# Patient Record
Sex: MALE | Race: WHITE | URBAN - METROPOLITAN AREA
[De-identification: names, ages, dates, MRNs, and addresses within clinical notes are randomized per-mention and may not be internally consistent; named-entity substitution may affect disease eponyms.]

---

## 2021-10-25 ENCOUNTER — OFFICE VISIT (OUTPATIENT)
Dept: FAMILY MEDICINE CLINIC | Age: 51
End: 2021-10-25
Payer: COMMERCIAL

## 2021-10-25 VITALS
SYSTOLIC BLOOD PRESSURE: 142 MMHG | RESPIRATION RATE: 13 BRPM | DIASTOLIC BLOOD PRESSURE: 90 MMHG | OXYGEN SATURATION: 97 % | HEART RATE: 104 BPM | HEIGHT: 69 IN | BODY MASS INDEX: 44.73 KG/M2 | WEIGHT: 302 LBS | TEMPERATURE: 98.4 F

## 2021-10-25 DIAGNOSIS — E11.9 TYPE 2 DIABETES MELLITUS WITHOUT COMPLICATION, WITHOUT LONG-TERM CURRENT USE OF INSULIN (HCC): Primary | ICD-10-CM

## 2021-10-25 DIAGNOSIS — Z23 NEED FOR TETANUS BOOSTER: ICD-10-CM

## 2021-10-25 LAB — HBA1C MFR BLD: 9.6 %

## 2021-10-25 PROCEDURE — 90471 IMMUNIZATION ADMIN: CPT | Performed by: INTERNAL MEDICINE

## 2021-10-25 PROCEDURE — 83036 HEMOGLOBIN GLYCOSYLATED A1C: CPT | Performed by: INTERNAL MEDICINE

## 2021-10-25 PROCEDURE — 99214 OFFICE O/P EST MOD 30 MIN: CPT | Performed by: INTERNAL MEDICINE

## 2021-10-25 PROCEDURE — 90715 TDAP VACCINE 7 YRS/> IM: CPT | Performed by: INTERNAL MEDICINE

## 2021-10-25 RX ORDER — LANCETS 30 GAUGE
EACH MISCELLANEOUS
Qty: 1 EACH | Refills: 5 | Status: SHIPPED | OUTPATIENT
Start: 2021-10-25 | End: 2022-01-04 | Stop reason: SDUPTHER

## 2021-10-25 RX ORDER — GLUCOSAMINE HCL/CHONDROITIN SU 500-400 MG
CAPSULE ORAL
Qty: 100 STRIP | Refills: 0 | Status: SHIPPED | OUTPATIENT
Start: 2021-10-25 | End: 2022-01-04 | Stop reason: SDUPTHER

## 2021-10-25 SDOH — ECONOMIC STABILITY: FOOD INSECURITY: WITHIN THE PAST 12 MONTHS, THE FOOD YOU BOUGHT JUST DIDN'T LAST AND YOU DIDN'T HAVE MONEY TO GET MORE.: NEVER TRUE

## 2021-10-25 SDOH — ECONOMIC STABILITY: FOOD INSECURITY: WITHIN THE PAST 12 MONTHS, YOU WORRIED THAT YOUR FOOD WOULD RUN OUT BEFORE YOU GOT MONEY TO BUY MORE.: NEVER TRUE

## 2021-10-25 ASSESSMENT — ENCOUNTER SYMPTOMS
RHINORRHEA: 0
WHEEZING: 0
SINUS PAIN: 0
BACK PAIN: 0
EYE DISCHARGE: 0
EYE ITCHING: 0
VOICE CHANGE: 0
CHEST TIGHTNESS: 0
COLOR CHANGE: 0
EYE REDNESS: 0
EYE PAIN: 0
CONSTIPATION: 0
SHORTNESS OF BREATH: 0
BLOOD IN STOOL: 0
SINUS PRESSURE: 0
NAUSEA: 0
SORE THROAT: 0
VOMITING: 0
RECTAL PAIN: 0
ABDOMINAL PAIN: 0
FACIAL SWELLING: 0
DIARRHEA: 0
APNEA: 0
COUGH: 0
TROUBLE SWALLOWING: 0
ABDOMINAL DISTENTION: 0
PHOTOPHOBIA: 0

## 2021-10-25 ASSESSMENT — PATIENT HEALTH QUESTIONNAIRE - PHQ9
SUM OF ALL RESPONSES TO PHQ QUESTIONS 1-9: 0
1. LITTLE INTEREST OR PLEASURE IN DOING THINGS: 0
SUM OF ALL RESPONSES TO PHQ QUESTIONS 1-9: 0
SUM OF ALL RESPONSES TO PHQ9 QUESTIONS 1 & 2: 0
2. FEELING DOWN, DEPRESSED OR HOPELESS: 0
SUM OF ALL RESPONSES TO PHQ QUESTIONS 1-9: 0

## 2021-10-25 ASSESSMENT — SOCIAL DETERMINANTS OF HEALTH (SDOH): HOW HARD IS IT FOR YOU TO PAY FOR THE VERY BASICS LIKE FOOD, HOUSING, MEDICAL CARE, AND HEATING?: NOT HARD AT ALL

## 2021-10-25 NOTE — PROGRESS NOTES
Subjective:      Patient ID: Bar Palafox is a 49 y.o. male Established patient, here for evaluation of the following chief complaint(s):  Chief Complaint   Patient presents with   Dano Qudenisk     found glucose in urine during work screening   [facility]     due for [facility]       HPI  50-year-old male recently found to have hyperglycemia during work screening. Blood sugars were noted to be 2040. The patient requests screening for type 2 diabetes. At present he denies polyuria,  Polydipsia, constitutional, sinus, visual, cardiopulmonary, urologic, gastrointestinal, immunologic/hematologic, musculoskeletal, neurologic,dermatologic, or psychiatric complaints. Review of Systems   Constitutional: Negative for chills, diaphoresis, fatigue and fever. HENT: Negative for congestion, dental problem, drooling, ear discharge, ear pain, facial swelling, hearing loss, mouth sores, nosebleeds, postnasal drip, rhinorrhea, sinus pressure, sinus pain, sneezing, sore throat, tinnitus, trouble swallowing and voice change. Eyes: Negative for photophobia, pain, discharge, redness, itching and visual disturbance. Respiratory: Negative for apnea, cough, chest tightness, shortness of breath and wheezing. Cardiovascular: Negative for chest pain, palpitations and leg swelling. Gastrointestinal: Negative for abdominal distention, abdominal pain, blood in stool, constipation, diarrhea, nausea, rectal pain and vomiting. Endocrine: Negative for cold intolerance, heat intolerance, polydipsia, polyphagia and polyuria. Genitourinary: Negative for decreased urine volume, difficulty urinating, dysuria, flank pain, frequency, genital sores, hematuria and urgency. Musculoskeletal: Negative for arthralgias, back pain, gait problem, joint swelling, myalgias, neck pain and neck stiffness. Skin: Negative for color change, rash and wound.    Allergic/Immunologic: Negative for environmental allergies and food allergies. Neurological: Negative for dizziness, tremors, seizures, syncope, facial asymmetry, speech difficulty, weakness, light-headedness, numbness and headaches. Hematological: Negative for adenopathy. Does not bruise/bleed easily. Psychiatric/Behavioral: Negative for agitation, confusion, decreased concentration, hallucinations, self-injury, sleep disturbance and suicidal ideas. The patient is not nervous/anxious. Objective:   BP (!) 142/90   Pulse 104   Temp 98.4 °F (36.9 °C)   Resp 13   Ht 5' 8.5\" (1.74 m)   Wt (!) 302 lb (137 kg)   SpO2 97%   BMI 45.25 kg/m²     Physical Exam  Constitutional:       General: He is not in acute distress. Appearance: He is well-developed. HENT:      Head: Normocephalic. Right Ear: External ear normal.      Left Ear: External ear normal.   Eyes:      Conjunctiva/sclera: Conjunctivae normal.   Neck:      Vascular: No JVD. Trachea: No tracheal deviation. Cardiovascular:      Rate and Rhythm: Normal rate and regular rhythm. Heart sounds: Normal heart sounds. Pulmonary:      Effort: Pulmonary effort is normal. No respiratory distress. Breath sounds: Normal breath sounds. No wheezing or rales. Chest:      Chest wall: No tenderness. Abdominal:      General: Bowel sounds are normal. There is no distension. Palpations: Abdomen is soft. There is no mass. Tenderness: There is no abdominal tenderness. There is no guarding or rebound. Musculoskeletal:         General: No tenderness or deformity. Cervical back: Neck supple. Skin:     General: Skin is warm and dry. Coloration: Skin is not pale. Findings: No erythema or rash. Neurological:      Mental Status: He is alert and oriented to person, place, and time. Cranial Nerves: No cranial nerve deficit. Motor: No abnormal muscle tone. Psychiatric:         Thought Content:  Thought content normal.         Judgment: Judgment normal.         Assessment: Diagnosis Orders   1. Type 2 diabetes mellitus without complication, without long-term current use of insulin (HCC)  POCT glycosylated hemoglobin (Hb A1C)    [facility] Diabetic Education, [facility]   2. Need for tetanus booster  Tdap (age 6y and older)  ([address])         Plan:      Chandan was seen today for check-up and health maintenance. Diagnoses and all orders for this visit:    Type 2 diabetes mellitus without complication, without long-term current use of insulin (HCC)  -     POCT glycosylated hemoglobin (Hb A1C)  -     [facility] Diabetic [facility], [facility] Nik Hebert    Need for tetanus booster  -     Tdap (age 6y and older)  ([address])    Other orders  -     blood glucose monitor kit and supplies; DX: diabetes mellitus. Test 1-3 time(s) daily - Ok to substitute per insurance  -     blood glucose monitor strips; Test 3 times a day & as needed for symptoms of irregular blood glucose. Dispense sufficient amount for indicated testing frequency plus additional to accommodate PRN testing needs. -     Lancets MISC; DX: diabetes mellitus. Use 1-3 time(s) daily - Ok to substitute per insurance (1 each = 1 box)  -     metFORMIN (GLUCOPHAGE) 500 MG tablet; Take 1 tablet by mouth 2 times daily (with meals)         Return in about 8 weeks (around 12/20/2021). On this date 10/25/21 I have spent 30 minutes reviewing previous notes, test results and face to face with the patient discussing the diagnosis and importance of compliance with the treatment plan. Bharath Toro MD    Please note, this report has been partially produced using speech recognition software  and may cause  and /or contain errors related to that system including grammar, punctuation and spelling as well as words and phrases that may seem inappropriate. If there are questions or concerns please feel free to contact me to clarify.

## 2021-11-22 ENCOUNTER — HOSPITAL ENCOUNTER (OUTPATIENT)
Dept: NUTRITION | Age: 51
Discharge: HOME | End: 2021-11-22
Payer: COMMERCIAL

## 2021-11-22 PROCEDURE — 97802 MEDICAL NUTRITION INDIV IN: CPT

## 2021-11-22 NOTE — PROGRESS NOTES
OUTPATIENT NUTRITION COUNSELING       Livan Stack is a 49 y.o.  male     Reason for Counseling: DM2/obesity      Subjective/Current Data:  Pt presents today with new onset DM2 with obesity. He is from home with his wife. Pt has actively been trying to make changes since his DM2 dx and admits it made him start to pay attention to his health. Reports he has been really starting to avoid sugar on nutrition labels. He is unsure if he has a family dx of dm2 as he was adopted as a baby. He admits to getting little physical activity, currently working night shift so his schedule makes it hard for him to eat right. He rarely drinks alcohol, does not drink coffee or pop, drinks ~32oz of water daily and Gaterade zero. Only eats fast food on occasion. He eats 2 meals a day. 24 hour recall includes: Breakfast: plain cheerios with milk; Lunch: Skip at work; Dinner: bowl of cereal.       Past Medical History:  Past Medical History:   Diagnosis Date    Seizures ([facility] [address] 2012     Past Surgical History:   Procedure Laterality Date    VASECTOMY  2009       Labs:    Chemistry CBC/Coags Misc. No results for input(s): NA, K, CL, CO2, BUN, CREATININE, GLUCOSE in the last 72 hours. Invalid input(s): CA  No results for input(s): PHOS in the last 72 hours. No results for input(s): WBC, RBC, HGB, HCT, MCV, MCH, MCHC, RDW, PLT, MPV in the last 72 hours. No results for input(s): LABALBU in the last 72 hours. Invalid input(s):  PREALBUMIN  Lab Results   Component Value Date    LABA1C 9.6 10/25/2021            Anthropometric Measures:  Height: 5'8\"  Weight: 302#  Ideal Body Weight: 154#  Ideal Body Weight %: >100%  BMI: 45.25  Obese Class III. Wt Readings from Last 20 Encounters:   10/25/21 (!) 302 lb (137 kg)   08/28/14 (!) 305 lb 9.6 oz (138.6 kg)   05/03/13 260 lb 9.6 oz (118.2 kg)   04/15/13 263 lb (119.3 kg)   04/04/13 266 lb (120.7 kg)   02/28/13 260 lb (117.9 kg)   12/27/12 283 lb 3.2 oz (128.5 kg)       Assessment and Plan:  I instructed the pt on basic diabetic diet guidelines. We discussed carb counting, foods that contained carbs, and how to incorporate the correct portions in each meals. I reviewed the importance of not skipping meals. Emphasis was placed on having a set meal schedule and getting regular physical activity. Together we practiced counting carbs and coming up with a meal plan that works best with the pt's lifestyle. Pt was receptive to education and asked appropriate questions. Readiness to change is high. Nutritional Requirements:  Estimated Energy Needs:  Weight Used:137kg   Method Used: 12kcal/kg  Estimated kcal Needs: 1600 kcal per day  Protein Needs:  Weight used: 70 kg  1 g/kg = 70g per day      Nutrition Diagnosis and Goal  Problem: Food and nutrition related knowledge deficit  Etiology/related to: no prior nutrition education   Symptoms/Signs/as evidenced by: 24 hour recall, new DM2 dx      Goal:   1) Eat 3-4 carb servings at every meal, 1-2 serving for evening snack  2) Drink 64oz of water daily   3) 1 hour of walking on treadmill every other day    Education Needs: Provided Diabetic guidelines, food label instructions, 1600 5 day menu, DM snacks        NUTRITION RECOMMENDATIONS / MONITORING / EVALUATION  1. Name and Office phone number given for reference.     Elizabeth Jansen, MS, RD, LD

## 2021-11-23 NOTE — TELEPHONE ENCOUNTER
Pt request med refill, has only 2 pills left. Has left message in [place]. Pt requests message that order has been placed on my chart.

## 2021-11-24 NOTE — TELEPHONE ENCOUNTER
Tino Kan [facility] Clinical Staff  Subject: Refill Request     QUESTIONS   Name of Medication? metFORMIN (GLUCOPHAGE) 500 MG tablet   Patient-reported dosage and instructions? doesn't know   How many days do you have left? 0   Preferred Pharmacy? [id number] [place]   Pharmacy phone number (if available)? [phone]   Additional Information for Provider? PT said he is out of medication   ---------------------------------------------------------------------------   --------------   CALL BACK INFO   What is the best way for the office to contact you? OK to leave message on   voicemail   Preferred Call Back Phone Number?  [phone]
CKD (chronic kidney disease) stage 3, GFR 30-59 ml/min

## 2021-12-01 NOTE — TELEPHONE ENCOUNTER
----- Message from Ann Marie Tiesha sent at 12/1/2021 10:04 AM EST -----  Subject: Refill Request    QUESTIONS  Name of Medication? metFORMIN (GLUCOPHAGE) 500 MG tablet  Patient-reported dosage and instructions? Take 1 tablet by mouth 2 times   daily (with meals)  How many days do you have left? 0  Preferred Pharmacy? [id number]  Pharmacy phone number (if available)? [phone]  ---------------------------------------------------------------------------  --------------  Reid Fearing INFO  What is the best way for the office to contact you? OK to leave message on   voicemail  Preferred Call Back Phone Number?  [phone]

## 2021-12-17 ASSESSMENT — ENCOUNTER SYMPTOMS
SORE THROAT: 0
PHOTOPHOBIA: 0
COLOR CHANGE: 0
CONSTIPATION: 0
RECTAL PAIN: 0
WHEEZING: 0
SINUS PRESSURE: 0
BACK PAIN: 0
APNEA: 0
SINUS PAIN: 0
VOICE CHANGE: 0
RHINORRHEA: 0
BLOOD IN STOOL: 0
CHEST TIGHTNESS: 0
EYE PAIN: 0
SHORTNESS OF BREATH: 0
EYE REDNESS: 0
EYE ITCHING: 0
ABDOMINAL DISTENTION: 0
NAUSEA: 0
FACIAL SWELLING: 0
EYE DISCHARGE: 0
ABDOMINAL PAIN: 0
DIARRHEA: 0
VOMITING: 0
TROUBLE SWALLOWING: 0
COUGH: 0

## 2021-12-17 NOTE — PROGRESS NOTES
Subjective:      Patient ID: Bar Palafox is a 49 y.o. male Established patient, here for evaluation of the following chief complaint(s):  Chief Complaint   Patient presents with    Follow-up    Diabetes     Last A1C was 9.6 on 10/25/21. Checks blood sugar once daily. Has a log with him today. HPI  52-year-old male type II diabetic presents for follow-up visit. Since initiating Glucophage 500 mg twice daily blood sugars have been well controlled (495Z951K). The patient is also achieved an approximate 20 pound weight loss. He has an appointment scheduled with a diabetic dietitian. At present he denies polyuria,  Polydipsia, constitutional, sinus, visual, cardiopulmonary, urologic, gastrointestinal, immunologic/hematologic, musculoskeletal, neurologic,dermatologic, or psychiatric complaints. Review of Systems   Constitutional: Negative for chills, diaphoresis, fatigue and fever. HENT: Negative for congestion, dental problem, drooling, ear discharge, ear pain, facial swelling, hearing loss, mouth sores, nosebleeds, postnasal drip, rhinorrhea, sinus pressure, sinus pain, sneezing, sore throat, tinnitus, trouble swallowing and voice change. Eyes: Negative for photophobia, pain, discharge, redness, itching and visual disturbance. Respiratory: Negative for apnea, cough, chest tightness, shortness of breath and wheezing. Cardiovascular: Negative for chest pain, palpitations and leg swelling. Gastrointestinal: Negative for abdominal distention, abdominal pain, blood in stool, constipation, diarrhea, nausea, rectal pain and vomiting. Endocrine: Negative for cold intolerance, heat intolerance, polydipsia, polyphagia and polyuria. Genitourinary: Negative for decreased urine volume, difficulty urinating, dysuria, flank pain, frequency, genital sores, hematuria and urgency.    Musculoskeletal: Negative for arthralgias, back pain, gait problem, joint swelling, myalgias, neck pain and neck stiffness. Skin: Negative for color change, rash and wound. Allergic/Immunologic: Negative for environmental allergies and food allergies. Neurological: Negative for dizziness, tremors, seizures, syncope, facial asymmetry, speech difficulty, weakness, light-headedness, numbness and headaches. Hematological: Negative for adenopathy. Does not bruise/bleed easily. Psychiatric/Behavioral: Negative for agitation, confusion, decreased concentration, hallucinations, self-injury, sleep disturbance and suicidal ideas. The patient is not nervous/anxious. Objective:   /80   Pulse 88   Temp 96.6 °F (35.9 °C)   Ht 5' 7\" (1.702 m)   Wt 281 lb 12.8 oz (127.8 kg)   SpO2 96%   BMI 44.14 kg/m²     Physical Exam  Constitutional:       General: He is not in acute distress. Appearance: He is well-developed. HENT:      Head: Normocephalic. Right Ear: External ear normal.      Left Ear: External ear normal.   Eyes:      Conjunctiva/sclera: Conjunctivae normal.   Neck:      Vascular: No JVD. Trachea: No tracheal deviation. Cardiovascular:      Rate and Rhythm: Normal rate and regular rhythm. Heart sounds: Normal heart sounds. Pulmonary:      Effort: Pulmonary effort is normal. No respiratory distress. Breath sounds: Normal breath sounds. No wheezing or rales. Chest:      Chest wall: No tenderness. Abdominal:      General: Bowel sounds are normal. There is no distension. Palpations: Abdomen is soft. There is no mass. Tenderness: There is no abdominal tenderness. There is no guarding or rebound. Musculoskeletal:         General: No tenderness or deformity. Cervical back: Neck supple. Skin:     General: Skin is warm and dry. Coloration: Skin is not pale. Findings: No erythema or rash. Neurological:      Mental Status: He is alert and oriented to person, place, and time. Cranial Nerves: No cranial nerve deficit.       Motor: No abnormal muscle tone.   Psychiatric:         Thought Content: Thought content normal.         Judgment: Judgment normal.         Assessment:       Diagnosis Orders   1. Type 2 diabetes mellitus without complication, without long-term current use of insulin ([facility])   DIABETES FOOT EXAM    Microalbumin / Creatinine Urine Ratio    Lipid, Fasting    Comprehensive Metabolic Panel    CBC With Auto Differential   2. Seizure disorder ([address]     3. Screening for colon cancer  [facility]   4. Prostate cancer screening  PSA Screening         Plan:      Tia Leblanc was seen today for follow-up and diabetes. Diagnoses and all orders for this visit:    Type 2 diabetes mellitus without complication, without long-term current use of insulin ([facility])  -      DIABETES FOOT EXAM  -     Microalbumin / Creatinine Urine Ratio; Future  -     Lipid, Fasting; Future  -     Comprehensive Metabolic Panel; Future  -     CBC With Auto Differential; Future    Seizure disorder ([facility] [address]    Screening for colon cancer  -     [facility]    Prostate cancer screening  -     PSA Screening; Future         Return in about 3 months (around 3/21/2022). On this date 12/21/21 I have spent 30 minutes reviewing previous notes, test results and face to face with the patient discussing the diagnosis and importance of compliance with the treatment plan. Alejandro Meza MD    Please note, this report has been partially produced using speech recognition software  and may cause  and /or contain errors related to that system including grammar, punctuation and spelling as well as words and phrases that may seem inappropriate. If there are questions or concerns please feel free to contact me to clarify.

## 2021-12-21 ENCOUNTER — OFFICE VISIT (OUTPATIENT)
Dept: FAMILY MEDICINE CLINIC | Age: 51
End: 2021-12-21
Payer: COMMERCIAL

## 2021-12-21 ENCOUNTER — IMMUNIZATION (OUTPATIENT)
Dept: FAMILY MEDICINE CLINIC | Age: 51
End: 2021-12-21
Payer: COMMERCIAL

## 2021-12-21 VITALS
BODY MASS INDEX: 44.23 KG/M2 | HEIGHT: 67 IN | HEART RATE: 88 BPM | OXYGEN SATURATION: 96 % | WEIGHT: 281.8 LBS | DIASTOLIC BLOOD PRESSURE: 80 MMHG | TEMPERATURE: 96.6 F | SYSTOLIC BLOOD PRESSURE: 134 MMHG

## 2021-12-21 DIAGNOSIS — Z12.5 PROSTATE CANCER SCREENING: ICD-10-CM

## 2021-12-21 DIAGNOSIS — E11.9 TYPE 2 DIABETES MELLITUS WITHOUT COMPLICATION, WITHOUT LONG-TERM CURRENT USE OF INSULIN (HCC): ICD-10-CM

## 2021-12-21 DIAGNOSIS — Z12.11 SCREENING FOR COLON CANCER: ICD-10-CM

## 2021-12-21 DIAGNOSIS — E11.9 TYPE 2 DIABETES MELLITUS WITHOUT COMPLICATION, WITHOUT LONG-TERM CURRENT USE OF INSULIN (HCC): Primary | ICD-10-CM

## 2021-12-21 DIAGNOSIS — G40.909 SEIZURE DISORDER (HCC): ICD-10-CM

## 2021-12-21 LAB
ALBUMIN SERPL-MCNC: 4.4 G/DL (ref 3.5–4.6)
ALP BLD-CCNC: 66 U/L (ref 35–104)
ALT SERPL-CCNC: 61 U/L (ref 0–41)
ANION GAP SERPL CALCULATED.3IONS-SCNC: 11 MEQ/L (ref 9–15)
AST SERPL-CCNC: 45 U/L (ref 0–40)
BASOPHILS ABSOLUTE: 0 K/UL (ref 0–0.2)
BASOPHILS RELATIVE PERCENT: 0.4 %
BILIRUB SERPL-MCNC: 0.5 MG/DL (ref 0.2–0.7)
BUN BLDV-MCNC: 17 MG/DL (ref 6–20)
CALCIUM SERPL-MCNC: 9.4 MG/DL (ref 8.5–9.9)
CHLORIDE BLD-SCNC: 103 MEQ/L (ref 95–107)
CHOLESTEROL, FASTING: 163 MG/DL (ref 0–199)
CO2: 24 MEQ/L (ref 20–31)
CREAT SERPL-MCNC: 0.93 MG/DL (ref 0.7–1.2)
CREATININE URINE: 246 MG/DL
EOSINOPHILS ABSOLUTE: 0.2 K/UL (ref 0–0.7)
EOSINOPHILS RELATIVE PERCENT: 2.3 %
GFR AFRICAN AMERICAN: >60
GFR NON-AFRICAN AMERICAN: >60
GLOBULIN: 3.3 G/DL (ref 2.3–3.5)
GLUCOSE BLD-MCNC: 128 MG/DL (ref 70–99)
HCT VFR BLD CALC: 48.7 % (ref 42–52)
HDLC SERPL-MCNC: 32 MG/DL (ref 40–59)
HEMOGLOBIN: 16.4 G/DL (ref 14–18)
LDL CHOLESTEROL CALCULATED: 76 MG/DL (ref 0–129)
LYMPHOCYTES ABSOLUTE: 2.6 K/UL (ref 1–4.8)
LYMPHOCYTES RELATIVE PERCENT: 39 %
MCH RBC QN AUTO: 30.3 PG (ref 27–31.3)
MCHC RBC AUTO-ENTMCNC: 33.7 % (ref 33–37)
MCV RBC AUTO: 90 FL (ref 80–100)
MICROALBUMIN UR-MCNC: 2 MG/DL
MICROALBUMIN/CREAT UR-RTO: 8.1 MG/G (ref 0–30)
MONOCYTES ABSOLUTE: 0.5 K/UL (ref 0.2–0.8)
MONOCYTES RELATIVE PERCENT: 7 %
NEUTROPHILS ABSOLUTE: 3.5 K/UL (ref 1.4–6.5)
NEUTROPHILS RELATIVE PERCENT: 51.3 %
PDW BLD-RTO: 13.4 % (ref 11.5–14.5)
PLATELET # BLD: 275 K/UL (ref 130–400)
POTASSIUM SERPL-SCNC: 4.4 MEQ/L (ref 3.4–4.9)
RBC # BLD: 5.42 M/UL (ref 4.7–6.1)
SODIUM BLD-SCNC: 138 MEQ/L (ref 135–144)
TOTAL PROTEIN: 7.7 G/DL (ref 6.3–8)
TRIGLYCERIDE, FASTING: 273 MG/DL (ref 0–150)
WBC # BLD: 6.8 K/UL (ref 4.8–10.8)

## 2021-12-21 PROCEDURE — 0064A COVID-19, MODERNA BOOSTER VACCINE 0.25ML DOSE: CPT | Performed by: FAMILY MEDICINE

## 2021-12-21 PROCEDURE — 99214 OFFICE O/P EST MOD 30 MIN: CPT | Performed by: INTERNAL MEDICINE

## 2021-12-21 PROCEDURE — 91306 COVID-19, MODERNA BOOSTER VACCINE 0.25ML DOSE: CPT | Performed by: FAMILY MEDICINE

## 2021-12-21 RX ORDER — LEVETIRACETAM 500 MG/1
500 TABLET ORAL 2 TIMES DAILY
COMMUNITY
Start: 2021-05-21

## 2021-12-22 LAB — PROSTATE SPECIFIC ANTIGEN: 0.26 NG/ML (ref 0–4)

## 2022-01-04 RX ORDER — GLUCOSAMINE HCL/CHONDROITIN SU 500-400 MG
CAPSULE ORAL
Qty: 100 STRIP | Refills: 0 | Status: SHIPPED | OUTPATIENT
Start: 2022-01-04 | End: 2022-02-18

## 2022-01-04 RX ORDER — LANCETS 30 GAUGE
EACH MISCELLANEOUS
Qty: 1 EACH | Refills: 5 | Status: SHIPPED | OUTPATIENT
Start: 2022-01-04 | End: 2022-07-18

## 2022-01-04 NOTE — TELEPHONE ENCOUNTER
Future Appointments    Encounter Information    Provider Department Appt Notes   1/6/2022 MD WILLA Jaime AT [facility] discuss lab results   3/21/2022 MD WILLA Kiran AT [facility] 3 month f/u       Recent Visits    12/21/2021 Type 2 diabetes mellitus without complication, without long-term current use of insulin [facility])   SOJOURN AT [facility] [address], MD

## 2022-01-04 NOTE — TELEPHONE ENCOUNTER
Future Appointments    Encounter Information    Provider Department Appt Notes   1/6/2022 MD WILLA Bullock AT [facility] discuss lab results   3/21/2022 MD WILLA Schulte AT [facility] 3 month f/u       Recent Visits    12/21/2021 Type 2 diabetes mellitus without complication, without long-term current use of insulin [facility])   SOJOURN AT [facility] [address], MD

## 2022-01-06 ENCOUNTER — VIRTUAL VISIT (OUTPATIENT)
Dept: FAMILY MEDICINE CLINIC | Age: 52
End: 2022-01-06
Payer: COMMERCIAL

## 2022-01-06 DIAGNOSIS — E11.9 TYPE 2 DIABETES MELLITUS WITHOUT COMPLICATION, WITHOUT LONG-TERM CURRENT USE OF INSULIN (HCC): Primary | ICD-10-CM

## 2022-01-06 DIAGNOSIS — G40.909 SEIZURE DISORDER (HCC): ICD-10-CM

## 2022-01-06 DIAGNOSIS — E78.5 HYPERLIPIDEMIA, UNSPECIFIED HYPERLIPIDEMIA TYPE: ICD-10-CM

## 2022-01-06 PROCEDURE — 99214 OFFICE O/P EST MOD 30 MIN: CPT | Performed by: INTERNAL MEDICINE

## 2022-01-06 RX ORDER — ATORVASTATIN CALCIUM 40 MG/1
40 TABLET, FILM COATED ORAL DAILY
Qty: 30 TABLET | Refills: 3 | Status: SHIPPED | OUTPATIENT
Start: 2022-01-06 | End: 2022-01-12 | Stop reason: SDUPTHER

## 2022-01-06 ASSESSMENT — ENCOUNTER SYMPTOMS
SORE THROAT: 0
DIARRHEA: 0
EYE PAIN: 0
TROUBLE SWALLOWING: 0
BLOOD IN STOOL: 0
VOICE CHANGE: 0
NAUSEA: 0
SINUS PRESSURE: 0
WHEEZING: 0
COLOR CHANGE: 0
CONSTIPATION: 0
BACK PAIN: 0
PHOTOPHOBIA: 0
APNEA: 0
EYE DISCHARGE: 0
VOMITING: 0
EYE REDNESS: 0
SINUS PAIN: 0
ABDOMINAL PAIN: 0
EYE ITCHING: 0
CHEST TIGHTNESS: 0
RECTAL PAIN: 0
COUGH: 0
FACIAL SWELLING: 0
ABDOMINAL DISTENTION: 0
RHINORRHEA: 0
SHORTNESS OF BREATH: 0

## 2022-01-06 NOTE — PROGRESS NOTES
Subjective:      Patient ID: Isaura Saleh is a 49 y.o. male Established patient, here for evaluation of the following chief complaint(s):  No chief complaint on file. HPI  50-year-old male type II diabetic presents for follow-up visit. The patient continues to comply with Glucophage 500 mg twice daily. His blood sugars have been well controlled (130s180s). Hyperlipidemia: The patient most recent lipid profile demonstrated hypertriglyceridemia as noted below:  Component      Latest Ref Rng & Units 12/21/2021          11:14 AM   Cholesterol, Fasting      0 - 199 mg/dL 163   Triglyceride, Fasting      0 - 150 mg/dL 273 (H)   HDL Cholesterol      40 - 59 mg/dL 32 (L)   LDL Calculated      0 - 129 mg/dL 76     No family history on file. Social History     Socioeconomic History    Marital status:      Spouse name: Not on file    Number of children: Not on file    Years of education: Not on file    Highest education level: Not on file   Occupational History    Not on file   Tobacco Use    Smoking status: Never Smoker    Smokeless tobacco: Never Used   Substance and Sexual Activity    Alcohol use: Yes     Comment: occ    Drug use: No    Sexual activity: Not on file   Other Topics Concern    Not on file   Social History Narrative    Not on file     Social Determinants of Health     Financial Resource Strain: Low Risk     Difficulty of Paying Living Expenses: Not hard at all   Food Insecurity: No Food Insecurity    Worried About [address] in the Last Year: Never true    [address] in the Last Year: Never true   Transportation Needs:     Lack of Transportation (Medical): Not on file    Lack of Transportation (Non-Medical):  Not on file   Physical Activity:     Days of Exercise per Week: Not on file    Minutes of Exercise per Session: Not on file   Stress:     Feeling of Stress : Not on file   Social Connections:     Frequency of Communication with Friends and Family: Not on file    Frequency of Social Gatherings with Friends and Family: Not on file    Attends Oriental orthodox Services: Not on file    Active Member of Clubs or Organizations: Not on file    Attends Club or Organization Meetings: Not on file    Marital Status: Not on file   Intimate Partner Violence:     Fear of Current or Ex-Partner: Not on file    Emotionally Abused: Not on file    Physically Abused: Not on file    Sexually Abused: Not on file   Housing Stability:     Unable to Pay for Housing in the Last Year: Not on file    Number of [place] in the Last Year: Not on file    Unstable Housing in the Last Year: Not on file     Current Outpatient Medications on File Prior to Visit   Medication Sig Dispense Refill    blood glucose monitor strips Test 3 times a day & as needed for symptoms of irregular blood glucose. Dispense sufficient amount for indicated testing frequency plus additional to accommodate PRN testing needs. 100 strip 0    Lancets MISC DX: diabetes mellitus. Use 1-3 time(s) daily - Ok to substitute per insurance (1 each = 1 box) 1 each 5    metFORMIN (GLUCOPHAGE) 500 MG tablet Take 1 tablet by mouth 2 times daily (with meals) 60 tablet 2    blood glucose monitor kit and supplies DX: diabetes mellitus. Test 1-3 time(s) daily - Ok to substitute per insurance 1 kit 0    levETIRAcetam (KEPPRA) 500 MG tablet Take 500 mg by mouth 2 times daily      Multiple Vitamin (MULTIVITAMIN ADULT PO) Take by mouth       No current facility-administered medications on file prior to visit. No Known Allergies     Past Surgical History:   Procedure Laterality Date    VASECTOMY  2009      At present he denies polyuria,  Polydipsia, constitutional, sinus, visual, cardiopulmonary, urologic, gastrointestinal, immunologic/hematologic, musculoskeletal, neurologic,dermatologic, or psychiatric complaints.     The 10-year ASCVD risk score (Noris Daly, et al., 2013) is: 8.5%    Values used to calculate the score:      Age: 49 years      Sex: Male      Is Non- : No      Diabetic: Yes      Tobacco smoker: No      Systolic Blood Pressure: 929 mmHg      Is BP treated: No      HDL Cholesterol: 32 mg/dL      Total Cholesterol: 163 mg/dL          Review of Systems   Constitutional: Negative for chills, diaphoresis, fatigue and fever. HENT: Negative for congestion, dental problem, drooling, ear discharge, ear pain, facial swelling, hearing loss, mouth sores, nosebleeds, postnasal drip, rhinorrhea, sinus pressure, sinus pain, sneezing, sore throat, tinnitus, trouble swallowing and voice change. Eyes: Negative for photophobia, pain, discharge, redness, itching and visual disturbance. Respiratory: Negative for apnea, cough, chest tightness, shortness of breath and wheezing. Cardiovascular: Negative for chest pain, palpitations and leg swelling. Gastrointestinal: Negative for abdominal distention, abdominal pain, blood in stool, constipation, diarrhea, nausea, rectal pain and vomiting. Endocrine: Negative for cold intolerance, heat intolerance, polydipsia, polyphagia and polyuria. Genitourinary: Negative for decreased urine volume, difficulty urinating, dysuria, flank pain, frequency, genital sores, hematuria and urgency. Musculoskeletal: Negative for arthralgias, back pain, gait problem, joint swelling, myalgias, neck pain and neck stiffness. Skin: Negative for color change, rash and wound. Allergic/Immunologic: Negative for environmental allergies and food allergies. Neurological: Negative for dizziness, tremors, seizures, syncope, facial asymmetry, speech difficulty, weakness, light-headedness, numbness and headaches. Hematological: Negative for adenopathy. Does not bruise/bleed easily. Psychiatric/Behavioral: Negative for agitation, confusion, decreased concentration, hallucinations, self-injury, sleep disturbance and suicidal ideas. The patient is not nervous/anxious. Objective: There were no vitals taken for this visit. Assessment:       Diagnosis Orders   1. Type 2 diabetes mellitus without complication, without long-term current use of insulin (HCC)     2. Seizure disorder ([address]     3. Hyperlipidemia, unspecified hyperlipidemia type           Plan:      Diagnoses and all orders for this visit:    Type 2 diabetes mellitus without complication, without long-term current use of insulin (HCC)continue Metformin    Seizure disorder (HCC)continue Keppra    Hyperlipidemia, unspecified hyperlipidemia type-  atorvastatin (LIPITOR) 40 MG tablet; Take 1 tablet by mouth daily         No follow-ups on file. TELEHEALTH EVALUATION -- Audio/Visual (During [facility] emergency)    -   Isaura Saleh is a 49 y.o. male being evaluated by a Virtual Visit (video visit) encounter to address concerns as mentioned above. A caregiver was present when appropriate. Due to this being a TeleHealth encounter (During [address] emergency), evaluation of the following organ systems was limited: Vitals/Constitutional/EENT/Resp/CV/GI//MS/Neuro/Skin/Heme-Lymph-Imm. Pursuant to the emergency declaration under the [address] [place] [address] authority and the [employer] Act, this Virtual Visit was conducted with patient's (and/or legal guardian's) consent, to reduce the patient's risk of exposure to COVID-19 and provide necessary medical care. The patient (and/or legal guardian) has also been advised to contact this office for worsening conditions or problems, and seek emergency medical treatment and/or call 911 if deemed necessary. Services were provided through a video synchronous discussion virtually to substitute for in-person clinic visit. Type of encounter was _x_ Doxy __ MyChart ___Facetime    Patient was located at their home.  Provider was located at their _x__ home or        ____ office. --Mauro Sicard, MD on 1/6/2022 at 10:11 AM    An electronic signature was used to authenticate this note. On this date 01/06/22 I have spent 30 minutes reviewing previous notes, test results and face to face with the patient discussing the diagnosis and importance of compliance with the treatment plan. Mauro Sicard, MD    Please note, this report has been partially produced using speech recognition software  and may cause  and /or contain errors related to that system including grammar, punctuation and spelling as well as words and phrases that may seem inappropriate. If there are questions or concerns please feel free to contact me to clarify.

## 2022-01-13 RX ORDER — ATORVASTATIN CALCIUM 40 MG/1
40 TABLET, FILM COATED ORAL DAILY
Qty: 30 TABLET | Refills: 3 | Status: SHIPPED | OUTPATIENT
Start: 2022-01-13 | End: 2022-04-26

## 2022-02-18 RX ORDER — BLOOD SUGAR DIAGNOSTIC
STRIP MISCELLANEOUS
Qty: 100 STRIP | Refills: 2 | Status: SHIPPED | OUTPATIENT
Start: 2022-02-18 | End: 2022-04-22 | Stop reason: SDUPTHER

## 2022-03-31 NOTE — TELEPHONE ENCOUNTER
Comments:     Last Office Visit (last PCP visit):   Visit date not found    Next Visit Date:  Future Appointments   Date Time Provider Usama Godfrey   4/4/2022  3:30 PM Mark Anthony Oconnor  [place]       **If hasn't been seen in over a year OR hasn't followed up according to last diabetes/ADHD visit, make appointment for patient before sending refill to provider.     Rx requested:  Requested Prescriptions     Pending Prescriptions Disp Refills    metFORMIN (GLUCOPHAGE) 500 MG tablet [Pharmacy Med Name: METFORMIN  MG TABLET] 180 tablet      Sig: TAKE 1 TABLET BY MOUTH TWICE A DAY WITH MEALS

## 2022-04-04 ENCOUNTER — OFFICE VISIT (OUTPATIENT)
Dept: FAMILY MEDICINE CLINIC | Age: 52
End: 2022-04-04
Payer: COMMERCIAL

## 2022-04-04 VITALS
WEIGHT: 270.6 LBS | HEIGHT: 67 IN | DIASTOLIC BLOOD PRESSURE: 70 MMHG | TEMPERATURE: 98.1 F | RESPIRATION RATE: 16 BRPM | OXYGEN SATURATION: 97 % | BODY MASS INDEX: 42.47 KG/M2 | SYSTOLIC BLOOD PRESSURE: 120 MMHG | HEART RATE: 79 BPM

## 2022-04-04 DIAGNOSIS — E11.9 TYPE 2 DIABETES MELLITUS WITHOUT COMPLICATION, WITHOUT LONG-TERM CURRENT USE OF INSULIN (HCC): Primary | ICD-10-CM

## 2022-04-04 DIAGNOSIS — R73.9 ELEVATED BLOOD SUGAR: ICD-10-CM

## 2022-04-04 LAB — HBA1C MFR BLD: 6.4 %

## 2022-04-04 PROCEDURE — 3044F HG A1C LEVEL LT 7.0%: CPT | Performed by: FAMILY MEDICINE

## 2022-04-04 PROCEDURE — 99213 OFFICE O/P EST LOW 20 MIN: CPT | Performed by: FAMILY MEDICINE

## 2022-04-04 PROCEDURE — 83036 HEMOGLOBIN GLYCOSYLATED A1C: CPT | Performed by: FAMILY MEDICINE

## 2022-04-04 ASSESSMENT — ENCOUNTER SYMPTOMS
EYES NEGATIVE: 1
SHORTNESS OF BREATH: 0
GASTROINTESTINAL NEGATIVE: 1
ALLERGIC/IMMUNOLOGIC NEGATIVE: 1
RESPIRATORY NEGATIVE: 1

## 2022-04-04 NOTE — PROGRESS NOTES
Patient is seen in follow up for   Chief Complaint   Patient presents with    Follow-up     patient is following up on chronic disease check     HPI    Past Medical History:   Diagnosis Date    Seizures [address] [address] 2012     Patient Active Problem List    Diagnosis Date Noted    Obesity 04/04/2013    Syncope 04/01/2013     Past Surgical History:   Procedure Laterality Date    VASECTOMY  2009     History reviewed. No pertinent family history. Social History     Socioeconomic History    Marital status:      Spouse name: None    Number of children: None    Years of education: None    Highest education level: None   Occupational History    None   Tobacco Use    Smoking status: Never Smoker    Smokeless tobacco: Never Used   Substance and Sexual Activity    Alcohol use: Yes     Comment: occ    Drug use: No    Sexual activity: None   Other Topics Concern    None   Social History Narrative    None     Social Determinants of Health     Financial Resource Strain: Low Risk     Difficulty of Paying Living Expenses: Not hard at all   Food Insecurity: No Food Insecurity    Worried About Running Out of Food in the Last Year: Never true    [address] in the Last Year: Never true   Transportation Needs:     Lack of Transportation (Medical): Not on file    Lack of Transportation (Non-Medical):  Not on file   Physical Activity:     Days of Exercise per Week: Not on file    Minutes of Exercise per Session: Not on file   Stress:     Feeling of Stress : Not on file   Social Connections:     Frequency of Communication with Friends and Family: Not on file    Frequency of Social Gatherings with Friends and Family: Not on file    Attends Voodoo Services: Not on file    Active Member of Clubs or Organizations: Not on file    Attends Club or Organization Meetings: Not on file    Marital Status: Not on file   Intimate Partner Violence:     Fear of Current or Ex-Partner: Not on file   [employer] Abused: Not on file    Physically Abused: Not on file    Sexually Abused: Not on file   Housing Stability:     Unable to Pay for Housing in the Last Year: Not on file    Number of Places Lived in the Last Year: Not on file    Unstable Housing in the Last Year: Not on file     Current Outpatient Medications   Medication Sig Dispense Refill    metFORMIN (GLUCOPHAGE) 500 MG tablet TAKE 1 TABLET BY MOUTH TWICE A DAY WITH MEALS 180 tablet 3    blood glucose test strips (ONETOUCH ULTRA) strip TEST 3 TIMES A DAY & AS NEEDED FOR SYMPTOMS OF IRREGULAR BLOOD GLUCOSE 100 strip 2    atorvastatin (LIPITOR) 40 MG tablet Take 1 tablet by mouth daily 30 tablet 3    Lancets MISC DX: diabetes mellitus. Use 1-3 time(s) daily - Ok to substitute per insurance (1 each = 1 box) 1 each 5    blood glucose monitor kit and supplies DX: diabetes mellitus. Test 1-3 time(s) daily - Ok to substitute per insurance 1 kit 0    levETIRAcetam (KEPPRA) 500 MG tablet Take 500 mg by mouth 2 times daily       No current facility-administered medications for this visit. Current Outpatient Medications on File Prior to Visit   Medication Sig Dispense Refill    metFORMIN (GLUCOPHAGE) 500 MG tablet TAKE 1 TABLET BY MOUTH TWICE A DAY WITH MEALS 180 tablet 3    blood glucose test strips (ONETOUCH ULTRA) strip TEST 3 TIMES A DAY & AS NEEDED FOR SYMPTOMS OF IRREGULAR BLOOD GLUCOSE 100 strip 2    atorvastatin (LIPITOR) 40 MG tablet Take 1 tablet by mouth daily 30 tablet 3    Lancets MISC DX: diabetes mellitus. Use 1-3 time(s) daily - Ok to substitute per insurance (1 each = 1 box) 1 each 5    blood glucose monitor kit and supplies DX: diabetes mellitus. Test 1-3 time(s) daily - Ok to substitute per insurance 1 kit 0    levETIRAcetam (KEPPRA) 500 MG tablet Take 500 mg by mouth 2 times daily       No current facility-administered medications on file prior to visit.      No Known Allergies  Health Maintenance   Topic Date Due    Hepatitis C screen Never done    Pneumococcal 0-64 years Vaccine (1 of 2 - PPSV23) Never done    HIV screen  Never done    Diabetic retinal exam  Never done    Hepatitis B vaccine (1 of 3 - Risk 3-dose series) Never done    Colorectal Cancer Screen  Never done    A1C test (Diabetic or Prediabetic)  07/04/2022    Depression Screen  10/25/2022    Diabetic foot exam  12/21/2022    Diabetic microalbuminuria test  12/21/2022    Lipid screen  12/21/2022    DTaP/Tdap/Td vaccine (2 - Td or Tdap) 10/25/2031    Flu vaccine  Completed    Shingles Vaccine  Completed    COVID-19 Vaccine  Completed    Hepatitis A vaccine  Aged Out    Hib vaccine  Aged Out    Meningococcal (ACWY) vaccine  Aged Out       Review of Systems     Review of Systems   Constitutional: Negative for activity change, appetite change, chills, fever and unexpected weight change. HENT: Negative. Eyes: Negative. Respiratory: Negative. Negative for shortness of breath. Cardiovascular: Negative. Negative for chest pain and palpitations. Gastrointestinal: Negative. Endocrine: Negative. Genitourinary: Negative. Musculoskeletal: Negative. Skin: Negative. Allergic/Immunologic: Negative. Neurological: Negative. Hematological: Negative. Psychiatric/Behavioral: Negative. Physical Exam  Vitals:    04/04/22 1459   BP: 120/70   Site: Right Upper Arm   Position: Sitting   Cuff Size: Large Adult   Pulse: 79   Resp: 16   Temp: 98.1 °F (36.7 °C)   TempSrc: Temporal   SpO2: 97%   Weight: 270 lb 9.6 oz (122.7 kg)   Height: 5' 7\" (1.702 m)       Physical Exam  Constitutional:       Appearance: He is well-developed. HENT:      Right Ear: External ear normal.      Left Ear: External ear normal.   Eyes:      Conjunctiva/sclera: Conjunctivae normal.      Pupils: Pupils are equal, round, and reactive to light. Neck:      Thyroid: No thyromegaly. Cardiovascular:      Rate and Rhythm: Normal rate and regular rhythm.       Heart sounds: Normal heart sounds. No murmur heard. No friction rub. No gallop. Pulmonary:      Effort: Pulmonary effort is normal. No respiratory distress. Breath sounds: Normal breath sounds. No wheezing. Abdominal:      General: Bowel sounds are normal. There is no distension. Palpations: Abdomen is soft. There is no mass. Tenderness: There is no abdominal tenderness. There is no guarding or rebound. Hernia: No hernia is present. Genitourinary:     Penis: Normal.    Musculoskeletal:         General: No tenderness. Normal range of motion. Cervical back: Normal range of motion and neck supple. Lymphadenopathy:      Cervical: No cervical adenopathy. Skin:     General: Skin is warm and dry. Neurological:      Mental Status: He is alert and oriented to person, place, and time. Cranial Nerves: No cranial nerve deficit. Coordination: Coordination normal.         Assessment   Diagnosis Orders   1. Type 2 diabetes mellitus without complication, without long-term current use of insulin (HCC)     2. Elevated blood sugar  POCT glycosylated hemoglobin (Hb A1C)     Problem List     None          Plan  Orders Placed This Encounter   Procedures    POCT glycosylated hemoglobin (Hb A1C)     No orders of the defined types were placed in this encounter. No follow-ups on file.   Arnulfo Lopez MD

## 2022-04-25 RX ORDER — BLOOD SUGAR DIAGNOSTIC
STRIP MISCELLANEOUS
Qty: 300 STRIP | Refills: 2 | Status: SHIPPED | OUTPATIENT
Start: 2022-04-25 | End: 2022-06-28 | Stop reason: SDUPTHER

## 2022-04-26 RX ORDER — ATORVASTATIN CALCIUM 40 MG/1
TABLET, FILM COATED ORAL
Qty: 90 TABLET | Refills: 1 | Status: SHIPPED | OUTPATIENT
Start: 2022-04-26 | End: 2022-06-28 | Stop reason: SDUPTHER

## 2022-06-28 RX ORDER — ATORVASTATIN CALCIUM 40 MG/1
40 TABLET, FILM COATED ORAL DAILY
Qty: 90 TABLET | Refills: 1 | Status: SHIPPED | OUTPATIENT
Start: 2022-06-28 | End: 2022-10-25

## 2022-06-28 RX ORDER — BLOOD SUGAR DIAGNOSTIC
STRIP MISCELLANEOUS
Qty: 300 STRIP | Refills: 2 | Status: SHIPPED | OUTPATIENT
Start: 2022-06-28

## 2022-07-16 NOTE — TELEPHONE ENCOUNTER
requesting medication refill.  Please approve or deny this request.    Rx requested:  Requested Prescriptions     Pending Prescriptions Disp Refills    Lancets ([address]) [address] [facility] [Pharmacy Med Name: [address]  1     Sig: USE TO TEST BLOOD SUGAR 1-3 TIMES DAILY         Last Office Visit:   4/4/2022      Next Visit Date:  Future Appointments   Date Time Provider Usama Godfrey   10/10/2022  6:00 PM Omar Drummond MD [address] [facility]

## 2022-07-18 RX ORDER — LANCETS 33 GAUGE
EACH MISCELLANEOUS
Qty: 300 EACH | Refills: 1 | Status: SHIPPED | OUTPATIENT
Start: 2022-07-18

## 2022-07-29 ENCOUNTER — COMMUNITY OUTREACH (OUTPATIENT)
Dept: FAMILY MEDICINE CLINIC | Age: 52
End: 2022-07-29

## 2022-07-29 NOTE — PROGRESS NOTES
Patient's HM shows they are overdue for Colonoscopy. [employer] and  files searched  without success.

## 2022-10-01 NOTE — TELEPHONE ENCOUNTER
Future Appointments    Encounter Information    Provider Department Appt Notes   10/20/2022 Myrna Perez MD [facility] 6 mo controlled DM     Past Visits    Date Provider Specialty Visit Type Primary Dx   04/04/2022 Myrna Perez MD Family Medicine Office Visit Type 2 diabetes mellitus without complication, without long-term current use of insulin ([facility] [address]

## 2022-10-25 RX ORDER — ATORVASTATIN CALCIUM 40 MG/1
TABLET, FILM COATED ORAL
Qty: 90 TABLET | Refills: 1 | Status: SHIPPED | OUTPATIENT
Start: 2022-10-25

## 2022-11-03 ENCOUNTER — OFFICE VISIT (OUTPATIENT)
Dept: FAMILY MEDICINE CLINIC | Age: 52
End: 2022-11-03
Payer: COMMERCIAL

## 2022-11-03 VITALS
HEIGHT: 67 IN | BODY MASS INDEX: 46.15 KG/M2 | TEMPERATURE: 97.7 F | SYSTOLIC BLOOD PRESSURE: 136 MMHG | OXYGEN SATURATION: 95 % | HEART RATE: 80 BPM | DIASTOLIC BLOOD PRESSURE: 80 MMHG | WEIGHT: 294 LBS | RESPIRATION RATE: 16 BRPM

## 2022-11-03 DIAGNOSIS — E11.9 TYPE 2 DIABETES MELLITUS WITHOUT COMPLICATION, WITHOUT LONG-TERM CURRENT USE OF INSULIN (HCC): Primary | ICD-10-CM

## 2022-11-03 LAB — HBA1C MFR BLD: 7 %

## 2022-11-03 PROCEDURE — 99213 OFFICE O/P EST LOW 20 MIN: CPT | Performed by: FAMILY MEDICINE

## 2022-11-03 PROCEDURE — 83036 HEMOGLOBIN GLYCOSYLATED A1C: CPT | Performed by: FAMILY MEDICINE

## 2022-11-03 PROCEDURE — 90471 IMMUNIZATION ADMIN: CPT | Performed by: FAMILY MEDICINE

## 2022-11-03 PROCEDURE — 90674 CCIIV4 VAC NO PRSV 0.5 ML IM: CPT | Performed by: FAMILY MEDICINE

## 2022-11-03 PROCEDURE — 3051F HG A1C>EQUAL 7.0%<8.0%: CPT | Performed by: FAMILY MEDICINE

## 2022-11-03 SDOH — ECONOMIC STABILITY: FOOD INSECURITY: WITHIN THE PAST 12 MONTHS, YOU WORRIED THAT YOUR FOOD WOULD RUN OUT BEFORE YOU GOT MONEY TO BUY MORE.: NEVER TRUE

## 2022-11-03 SDOH — ECONOMIC STABILITY: FOOD INSECURITY: WITHIN THE PAST 12 MONTHS, THE FOOD YOU BOUGHT JUST DIDN'T LAST AND YOU DIDN'T HAVE MONEY TO GET MORE.: NEVER TRUE

## 2022-11-03 ASSESSMENT — PATIENT HEALTH QUESTIONNAIRE - PHQ9
SUM OF ALL RESPONSES TO PHQ QUESTIONS 1-9: 0
1. LITTLE INTEREST OR PLEASURE IN DOING THINGS: 0
2. FEELING DOWN, DEPRESSED OR HOPELESS: 0
SUM OF ALL RESPONSES TO PHQ9 QUESTIONS 1 & 2: 0

## 2022-11-03 ASSESSMENT — ENCOUNTER SYMPTOMS
ALLERGIC/IMMUNOLOGIC NEGATIVE: 1
GASTROINTESTINAL NEGATIVE: 1
EYES NEGATIVE: 1
SHORTNESS OF BREATH: 0
RESPIRATORY NEGATIVE: 1

## 2022-11-03 ASSESSMENT — SOCIAL DETERMINANTS OF HEALTH (SDOH): HOW HARD IS IT FOR YOU TO PAY FOR THE VERY BASICS LIKE FOOD, HOUSING, MEDICAL CARE, AND HEATING?: NOT HARD AT ALL

## 2022-11-03 NOTE — PROGRESS NOTES
Vaccine Information Sheet, \"Influenza - Inactivated\"  given to Mayra Carroll, or parent/legal guardian of  Mayra Carroll and verbalized understanding. Patient responses:    Have you ever had a reaction to a flu vaccine? No  Are you able to eat eggs without adverse effects? Yes  Do you have any current illness? No  Have you ever had [place] Syndrome? No    Flu vaccine given per order. Please see immunization tab.

## 2022-11-03 NOTE — PROGRESS NOTES
Patient is seen in follow up for   Chief Complaint   Patient presents with    3 Month Follow-Up    Diabetes     Last a1c was 6.4     Diabetes  He presents for his follow-up diabetic visit. He has type 2 diabetes mellitus. The initial diagnosis of diabetes was made 5 years ago. His disease course has been stable. There are no hypoglycemic associated symptoms. There are no diabetic associated symptoms. Pertinent negatives for diabetes include no chest pain. There are no hypoglycemic complications. Symptoms are stable. There are no diabetic complications. Risk factors for coronary artery disease include diabetes mellitus, hypertension and male sex. Past Medical History:   Diagnosis Date    Seizures ([address] 2012     Patient Active Problem List    Diagnosis Date Noted    Obesity 04/04/2013    Syncope 04/01/2013     Past Surgical History:   Procedure Laterality Date    VASECTOMY  2009     No family history on file.   Social History     Socioeconomic History    Marital status:      Spouse name: None    Number of children: None    Years of education: None    Highest education level: None   Tobacco Use    Smoking status: Never    Smokeless tobacco: Never   Substance and Sexual Activity    Alcohol use: Yes     Comment: occ    Drug use: No     Social Determinants of Health     Financial Resource Strain: Low Risk     Difficulty of Paying Living Expenses: Not hard at all   Food Insecurity: No Food Insecurity    Worried About Running Out of Food in the Last Year: Never true    Ran Out of Food in the Last Year: Never true     Current Outpatient Medications   Medication Sig Dispense Refill    atorvastatin (LIPITOR) 40 MG tablet TAKE 1 TABLET BY MOUTH EVERY DAY 90 tablet 1    metFORMIN (GLUCOPHAGE) 500 MG tablet Take 1 tablet by mouth 2 times daily (with meals) 180 tablet 3    Lancets (ONETOUCH DELICA PLUS [id number]) MISC USE TO TEST BLOOD SUGAR 1-3 TIMES DAILY 300 each 1    blood glucose test strips (ONETOUCH ULTRA) strip TEST 3 TIMES A DAY & AS NEEDED FOR SYMPTOMS OF IRREGULAR BLOOD GLUCOSE 300 strip 2    blood glucose monitor kit and supplies DX: diabetes mellitus. Test 1-3 time(s) daily - Ok to substitute per insurance 1 kit 0    levETIRAcetam (KEPPRA) 500 MG tablet Take 500 mg by mouth 2 times daily       No current facility-administered medications for this visit. Current Outpatient Medications on File Prior to Visit   Medication Sig Dispense Refill    atorvastatin (LIPITOR) 40 MG tablet TAKE 1 TABLET BY MOUTH EVERY DAY 90 tablet 1    metFORMIN (GLUCOPHAGE) 500 MG tablet Take 1 tablet by mouth 2 times daily (with meals) 180 tablet 3    Lancets (ONETOUCH DELICA PLUS [id number]) MISC USE TO TEST BLOOD SUGAR 1-3 TIMES DAILY 300 each 1    blood glucose test strips (ONETOUCH ULTRA) strip TEST 3 TIMES A DAY & AS NEEDED FOR SYMPTOMS OF IRREGULAR BLOOD GLUCOSE 300 strip 2    blood glucose monitor kit and supplies DX: diabetes mellitus. Test 1-3 time(s) daily - Ok to substitute per insurance 1 kit 0    levETIRAcetam (KEPPRA) 500 MG tablet Take 500 mg by mouth 2 times daily       No current facility-administered medications on file prior to visit.      No Known Allergies  Health Maintenance   Topic Date Due    Pneumococcal 0-64 years Vaccine (1 - PCV) Never done    HIV screen  Never done    Diabetic retinal exam  Never done    Hepatitis C screen  Never done    Hepatitis B vaccine (1 of 3 - Risk 3-dose series) Never done    Colorectal Cancer Screen  Never done    COVID-19 Vaccine (4 - Booster for Moderna series) 02/15/2022    Depression Screen  10/25/2022    Diabetic foot exam  12/21/2022    Diabetic microalbuminuria test  12/21/2022    Lipids  12/21/2022    A1C test (Diabetic or Prediabetic)  11/03/2023    DTaP/Tdap/Td vaccine (2 - Td or Tdap) 10/25/2031    Flu vaccine  Completed    Shingles vaccine  Completed    Hepatitis A vaccine  Aged Out    Hib vaccine  Aged Out    Meningococcal (ACWY) vaccine  Aged Out       Review of Systems Review of Systems   Constitutional:  Negative for activity change, appetite change, chills, fever and unexpected weight change. HENT: Negative. Eyes: Negative. Respiratory: Negative. Negative for shortness of breath. Cardiovascular: Negative. Negative for chest pain and palpitations. Gastrointestinal: Negative. Endocrine: Negative. Genitourinary: Negative. Musculoskeletal: Negative. Skin: Negative. Allergic/Immunologic: Negative. Neurological: Negative. Hematological: Negative. Psychiatric/Behavioral: Negative. Physical Exam  Vitals:    11/03/22 0854   BP: 136/80   Pulse: 80   Resp: 16   Temp: 97.7 °F (36.5 °C)   SpO2: 95%   Weight: 294 lb (133.4 kg)   Height: 5' 7\" (1.702 m)       Physical Exam  Constitutional:       Appearance: He is well-developed. HENT:      Right Ear: External ear normal.      Left Ear: External ear normal.   Eyes:      Conjunctiva/sclera: Conjunctivae normal.      Pupils: Pupils are equal, round, and reactive to light. Neck:      Thyroid: No thyromegaly. Cardiovascular:      Rate and Rhythm: Normal rate and regular rhythm. Heart sounds: Normal heart sounds. No murmur heard. No friction rub. No gallop. Pulmonary:      Effort: Pulmonary effort is normal. No respiratory distress. Breath sounds: Normal breath sounds. No wheezing. Abdominal:      General: Bowel sounds are normal. There is no distension. Palpations: Abdomen is soft. There is no mass. Tenderness: There is no abdominal tenderness. There is no guarding or rebound. Hernia: No hernia is present. Genitourinary:     Penis: Normal.    Musculoskeletal:         General: No tenderness. Normal range of motion. Cervical back: Normal range of motion and neck supple. Lymphadenopathy:      Cervical: No cervical adenopathy. Skin:     General: Skin is warm and dry. Neurological:      Mental Status: He is alert and oriented to person, place, and time. Cranial Nerves: No cranial nerve deficit. Coordination: Coordination normal.       Assessment   Diagnosis Orders   1. Type 2 diabetes mellitus without complication, without long-term current use of insulin (HCC)  POCT glycosylated hemoglobin (Hb A1C)        Problem List    None    Plan  Orders Placed This Encounter   Procedures    Influenza, FLUCELVAX, (age 10 mo+), IM, Preservative Free, 0.5 mL    POCT glycosylated hemoglobin (Hb A1C)     No orders of the defined types were placed in this encounter. No follow-ups on file.   Kateryna Starr MD

## 2022-12-21 ENCOUNTER — TELEPHONE (OUTPATIENT)
Dept: GASTROENTEROLOGY | Age: 52
End: 2022-12-21

## 2022-12-29 ENCOUNTER — TELEPHONE (OUTPATIENT)
Dept: FAMILY MEDICINE CLINIC | Age: 52
End: 2022-12-29

## 2022-12-29 DIAGNOSIS — Z12.11 SCREENING FOR COLON CANCER: Primary | ICD-10-CM

## 2023-01-13 RX ORDER — LANCETS 33 GAUGE
EACH MISCELLANEOUS
Qty: 300 EACH | Refills: 1 | Status: SHIPPED | OUTPATIENT
Start: 2023-01-13

## 2023-02-21 RX ORDER — ATORVASTATIN CALCIUM 40 MG/1
TABLET, FILM COATED ORAL
Qty: 90 TABLET | Refills: 3 | Status: SHIPPED | OUTPATIENT
Start: 2023-02-21

## 2023-04-24 RX ORDER — BLOOD SUGAR DIAGNOSTIC
STRIP MISCELLANEOUS
Qty: 300 STRIP | Refills: 2 | Status: SHIPPED | OUTPATIENT
Start: 2023-04-24

## 2023-05-01 SDOH — ECONOMIC STABILITY: INCOME INSECURITY: HOW HARD IS IT FOR YOU TO PAY FOR THE VERY BASICS LIKE FOOD, HOUSING, MEDICAL CARE, AND HEATING?: NOT VERY HARD

## 2023-05-01 SDOH — ECONOMIC STABILITY: HOUSING INSECURITY
IN THE LAST 12 MONTHS, WAS THERE A TIME WHEN YOU DID NOT HAVE A STEADY PLACE TO SLEEP OR SLEPT IN A SHELTER (INCLUDING NOW)?: NO

## 2023-05-01 SDOH — ECONOMIC STABILITY: TRANSPORTATION INSECURITY
IN THE PAST 12 MONTHS, HAS LACK OF TRANSPORTATION KEPT YOU FROM MEETINGS, WORK, OR FROM GETTING THINGS NEEDED FOR DAILY LIVING?: NO

## 2023-05-01 SDOH — ECONOMIC STABILITY: FOOD INSECURITY: WITHIN THE PAST 12 MONTHS, THE FOOD YOU BOUGHT JUST DIDN'T LAST AND YOU DIDN'T HAVE MONEY TO GET MORE.: NEVER TRUE

## 2023-05-01 SDOH — ECONOMIC STABILITY: FOOD INSECURITY: WITHIN THE PAST 12 MONTHS, YOU WORRIED THAT YOUR FOOD WOULD RUN OUT BEFORE YOU GOT MONEY TO BUY MORE.: NEVER TRUE

## 2023-05-04 ENCOUNTER — OFFICE VISIT (OUTPATIENT)
Dept: FAMILY MEDICINE CLINIC | Age: 53
End: 2023-05-04
Payer: COMMERCIAL

## 2023-05-04 VITALS
WEIGHT: 313 LBS | DIASTOLIC BLOOD PRESSURE: 98 MMHG | RESPIRATION RATE: 17 BRPM | BODY MASS INDEX: 49.12 KG/M2 | SYSTOLIC BLOOD PRESSURE: 172 MMHG | OXYGEN SATURATION: 95 % | HEART RATE: 86 BPM | TEMPERATURE: 97.6 F | HEIGHT: 67 IN

## 2023-05-04 DIAGNOSIS — E78.2 MIXED HYPERLIPIDEMIA: ICD-10-CM

## 2023-05-04 DIAGNOSIS — Z11.59 ENCOUNTER FOR HEPATITIS C SCREENING TEST FOR LOW RISK PATIENT: ICD-10-CM

## 2023-05-04 DIAGNOSIS — E11.9 TYPE 2 DIABETES MELLITUS WITHOUT COMPLICATION, WITHOUT LONG-TERM CURRENT USE OF INSULIN (HCC): ICD-10-CM

## 2023-05-04 DIAGNOSIS — E11.9 TYPE 2 DIABETES MELLITUS WITHOUT COMPLICATION, WITHOUT LONG-TERM CURRENT USE OF INSULIN (HCC): Primary | ICD-10-CM

## 2023-05-04 DIAGNOSIS — I10 PRIMARY HYPERTENSION: ICD-10-CM

## 2023-05-04 DIAGNOSIS — Z11.4 ENCOUNTER FOR SCREENING FOR HIV: ICD-10-CM

## 2023-05-04 DIAGNOSIS — N40.1 BENIGN PROSTATIC HYPERPLASIA WITH URINARY FREQUENCY: ICD-10-CM

## 2023-05-04 DIAGNOSIS — Z12.5 SCREENING PSA (PROSTATE SPECIFIC ANTIGEN): ICD-10-CM

## 2023-05-04 DIAGNOSIS — Z12.11 COLON CANCER SCREENING: ICD-10-CM

## 2023-05-04 DIAGNOSIS — R35.0 BENIGN PROSTATIC HYPERPLASIA WITH URINARY FREQUENCY: ICD-10-CM

## 2023-05-04 LAB
ALBUMIN SERPL-MCNC: 4.4 G/DL (ref 3.5–4.6)
ALP SERPL-CCNC: 75 U/L (ref 35–104)
ALT SERPL-CCNC: 41 U/L (ref 0–41)
ANION GAP SERPL CALCULATED.3IONS-SCNC: 13 MEQ/L (ref 9–15)
AST SERPL-CCNC: 32 U/L (ref 0–40)
BASOPHILS # BLD: 0.1 K/UL (ref 0–0.2)
BASOPHILS NFR BLD: 0.6 %
BILIRUB SERPL-MCNC: 0.5 MG/DL (ref 0.2–0.7)
BUN SERPL-MCNC: 17 MG/DL (ref 6–20)
CALCIUM SERPL-MCNC: 9.1 MG/DL (ref 8.5–9.9)
CHLORIDE SERPL-SCNC: 103 MEQ/L (ref 95–107)
CHOLEST SERPL-MCNC: 168 MG/DL (ref 0–199)
CO2 SERPL-SCNC: 24 MEQ/L (ref 20–31)
CREAT SERPL-MCNC: 0.9 MG/DL (ref 0.7–1.2)
EOSINOPHIL # BLD: 0.7 K/UL (ref 0–0.7)
EOSINOPHIL NFR BLD: 7.7 %
ERYTHROCYTE [DISTWIDTH] IN BLOOD BY AUTOMATED COUNT: 13.5 % (ref 11.5–14.5)
GLOBULIN SER CALC-MCNC: 3 G/DL (ref 2.3–3.5)
GLUCOSE SERPL-MCNC: 196 MG/DL (ref 70–99)
HBA1C MFR BLD: 9 % (ref 4.8–5.9)
HCT VFR BLD AUTO: 49.1 % (ref 42–52)
HDLC SERPL-MCNC: 42 MG/DL (ref 40–59)
HGB BLD-MCNC: 16.4 G/DL (ref 14–18)
LDLC SERPL CALC-MCNC: 67 MG/DL (ref 0–129)
LYMPHOCYTES # BLD: 4.7 K/UL (ref 1–4.8)
LYMPHOCYTES NFR BLD: 50.1 %
MCH RBC QN AUTO: 30.8 PG (ref 27–31.3)
MCHC RBC AUTO-ENTMCNC: 33.4 % (ref 33–37)
MCV RBC AUTO: 92.4 FL (ref 79–92.2)
MONOCYTES # BLD: 0.5 K/UL (ref 0.2–0.8)
MONOCYTES NFR BLD: 5.3 %
NEUTROPHILS # BLD: 3.4 K/UL (ref 1.4–6.5)
NEUTS SEG NFR BLD: 36.3 %
PLATELET # BLD AUTO: 224 K/UL (ref 130–400)
POTASSIUM SERPL-SCNC: 4.7 MEQ/L (ref 3.4–4.9)
PROT SERPL-MCNC: 7.4 G/DL (ref 6.3–8)
PSA SERPL-MCNC: 0.19 NG/ML (ref 0–4)
RBC # BLD AUTO: 5.31 M/UL (ref 4.7–6.1)
SODIUM SERPL-SCNC: 140 MEQ/L (ref 135–144)
TRIGL SERPL-MCNC: 294 MG/DL (ref 0–150)
WBC # BLD AUTO: 9.4 K/UL (ref 4.8–10.8)

## 2023-05-04 PROCEDURE — 99214 OFFICE O/P EST MOD 30 MIN: CPT | Performed by: FAMILY MEDICINE

## 2023-05-04 PROCEDURE — 3077F SYST BP >= 140 MM HG: CPT | Performed by: FAMILY MEDICINE

## 2023-05-04 PROCEDURE — 3080F DIAST BP >= 90 MM HG: CPT | Performed by: FAMILY MEDICINE

## 2023-05-04 RX ORDER — LISINOPRIL 20 MG/1
20 TABLET ORAL DAILY
Qty: 90 TABLET | Refills: 3 | Status: SHIPPED | OUTPATIENT
Start: 2023-05-04

## 2023-05-04 RX ORDER — TAMSULOSIN HYDROCHLORIDE 0.4 MG/1
0.4 CAPSULE ORAL DAILY
Qty: 90 CAPSULE | Refills: 3 | Status: SHIPPED | OUTPATIENT
Start: 2023-05-04

## 2023-05-04 ASSESSMENT — ENCOUNTER SYMPTOMS
SHORTNESS OF BREATH: 0
GASTROINTESTINAL NEGATIVE: 1
EYES NEGATIVE: 1
ALLERGIC/IMMUNOLOGIC NEGATIVE: 1
RESPIRATORY NEGATIVE: 1

## 2023-05-04 ASSESSMENT — PATIENT HEALTH QUESTIONNAIRE - PHQ9
SUM OF ALL RESPONSES TO PHQ QUESTIONS 1-9: 0
SUM OF ALL RESPONSES TO PHQ QUESTIONS 1-9: 0
SUM OF ALL RESPONSES TO PHQ9 QUESTIONS 1 & 2: 0
SUM OF ALL RESPONSES TO PHQ QUESTIONS 1-9: 0
1. LITTLE INTEREST OR PLEASURE IN DOING THINGS: 0
2. FEELING DOWN, DEPRESSED OR HOPELESS: 0
SUM OF ALL RESPONSES TO PHQ QUESTIONS 1-9: 0

## 2023-05-04 NOTE — PROGRESS NOTES
Patient is seen in follow up for   Chief Complaint   Patient presents with    6 Month Follow-Up    Diabetes     Last a1c was 7.0     Diabetes  He presents for his follow-up diabetic visit. He has type 2 diabetes mellitus. The initial diagnosis of diabetes was made 7 years ago. His disease course has been stable. There are no hypoglycemic associated symptoms. There are no diabetic associated symptoms. Pertinent negatives for diabetes include no chest pain. There are no hypoglycemic complications. Symptoms are stable. There are no diabetic complications. Risk factors for coronary artery disease include diabetes mellitus. Current diabetic treatment includes oral agent (monotherapy). He is compliant with treatment most of the time. Past Medical History:   Diagnosis Date    Seizures ([facility] [address] 2012     Patient Active Problem List    Diagnosis Date Noted    Obesity 04/04/2013    Syncope 04/01/2013     Past Surgical History:   Procedure Laterality Date    VASECTOMY  2009     No family history on file.   Social History     Socioeconomic History    Marital status:      Spouse name: None    Number of children: None    Years of education: None    Highest education level: None   Tobacco Use    Smoking status: Never    Smokeless tobacco: Never   Substance and Sexual Activity    Alcohol use: Yes     Comment: occ    Drug use: No     Social Determinants of Health     Financial Resource Strain: Low Risk     Difficulty of Paying Living Expenses: Not very hard   Food Insecurity: No Food Insecurity    Worried About Running Out of Food in the Last Year: Never true    Ran Out of Food in the Last Year: Never true   Transportation Needs: Unknown    Lack of Transportation (Non-Medical): No   Housing Stability: Unknown    Unstable Housing in the Last Year: No     Current Outpatient Medications   Medication Sig Dispense Refill    lisinopril (PRINIVIL;ZESTRIL) 20 MG tablet Take 1 tablet by mouth daily 90 tablet 3    tamsulosin (FLOMAX)

## 2023-05-05 ENCOUNTER — PREP FOR PROCEDURE (OUTPATIENT)
Dept: GASTROENTEROLOGY | Age: 53
End: 2023-05-05

## 2023-05-05 PROBLEM — Z12.11 COLON CANCER SCREENING: Status: ACTIVE | Noted: 2023-05-05

## 2023-05-05 LAB
HEPATITIS C ANTIBODY: NONREACTIVE
HIV AG/AB: NONREACTIVE

## 2023-06-04 PROBLEM — Z12.11 COLON CANCER SCREENING: Status: RESOLVED | Noted: 2023-05-05 | Resolved: 2023-06-04

## 2023-07-10 ENCOUNTER — PREP FOR PROCEDURE (OUTPATIENT)
Dept: GASTROENTEROLOGY | Age: 53
End: 2023-07-10

## 2023-07-10 RX ORDER — SODIUM CHLORIDE 9 MG/ML
INJECTION, SOLUTION INTRAVENOUS PRN
Status: CANCELLED | OUTPATIENT
Start: 2023-07-10

## 2023-07-10 RX ORDER — SODIUM CHLORIDE 0.9 % (FLUSH) 0.9 %
5-40 SYRINGE (ML) INJECTION EVERY 12 HOURS SCHEDULED
Status: CANCELLED | OUTPATIENT
Start: 2023-07-10

## 2023-07-10 RX ORDER — SODIUM CHLORIDE 9 MG/ML
INJECTION, SOLUTION INTRAVENOUS CONTINUOUS
Status: CANCELLED | OUTPATIENT
Start: 2023-07-10

## 2023-07-12 ENCOUNTER — HOSPITAL ENCOUNTER (OUTPATIENT)
Age: 53
Setting detail: OUTPATIENT SURGERY
Discharge: HOME | End: 2023-07-12
Attending: INTERNAL MEDICINE | Admitting: INTERNAL MEDICINE
Payer: COMMERCIAL

## 2023-07-12 ENCOUNTER — ANESTHESIA (OUTPATIENT)
Dept: ENDOSCOPY | Age: 53
End: 2023-07-12
Payer: COMMERCIAL

## 2023-07-12 ENCOUNTER — ANESTHESIA EVENT (OUTPATIENT)
Dept: ENDOSCOPY | Age: 53
End: 2023-07-12
Payer: COMMERCIAL

## 2023-07-12 VITALS
HEIGHT: 67 IN | SYSTOLIC BLOOD PRESSURE: 125 MMHG | HEART RATE: 73 BPM | RESPIRATION RATE: 16 BRPM | BODY MASS INDEX: 49.12 KG/M2 | TEMPERATURE: 98.1 F | WEIGHT: 313 LBS | DIASTOLIC BLOOD PRESSURE: 83 MMHG | OXYGEN SATURATION: 95 %

## 2023-07-12 DIAGNOSIS — Z12.11 COLON CANCER SCREENING: ICD-10-CM

## 2023-07-12 PROBLEM — K63.5 POLYP OF COLON: Status: ACTIVE | Noted: 2023-07-12

## 2023-07-12 LAB
GLUCOSE BLD-MCNC: 154 MG/DL (ref 70–99)
PERFORMED ON: ABNORMAL

## 2023-07-12 PROCEDURE — 3700000000 HC ANESTHESIA ATTENDED CARE: Performed by: INTERNAL MEDICINE

## 2023-07-12 PROCEDURE — 6360000002 HC RX W HCPCS: Performed by: NURSE ANESTHETIST, CERTIFIED REGISTERED

## 2023-07-12 PROCEDURE — 7100000011 HC PHASE II RECOVERY - ADDTL 15 MIN: Performed by: INTERNAL MEDICINE

## 2023-07-12 PROCEDURE — 3609027000 HC COLONOSCOPY: Performed by: INTERNAL MEDICINE

## 2023-07-12 PROCEDURE — 2500000003 HC RX 250 WO HCPCS: Performed by: NURSE ANESTHETIST, CERTIFIED REGISTERED

## 2023-07-12 PROCEDURE — 2580000003 HC RX 258

## 2023-07-12 PROCEDURE — 3700000001 HC ADD 15 MINUTES (ANESTHESIA): Performed by: INTERNAL MEDICINE

## 2023-07-12 PROCEDURE — 6370000000 HC RX 637 (ALT 250 FOR IP): Performed by: INTERNAL MEDICINE

## 2023-07-12 PROCEDURE — 2580000003 HC RX 258: Performed by: INTERNAL MEDICINE

## 2023-07-12 PROCEDURE — 88305 TISSUE EXAM BY PATHOLOGIST: CPT

## 2023-07-12 PROCEDURE — 7100000010 HC PHASE II RECOVERY - FIRST 15 MIN: Performed by: INTERNAL MEDICINE

## 2023-07-12 PROCEDURE — 2709999900 HC NON-CHARGEABLE SUPPLY: Performed by: INTERNAL MEDICINE

## 2023-07-12 RX ORDER — SODIUM CHLORIDE 0.9 % (FLUSH) 0.9 %
5-40 SYRINGE (ML) INJECTION EVERY 12 HOURS SCHEDULED
Status: DISCONTINUED | OUTPATIENT
Start: 2023-07-12 | End: 2023-07-12 | Stop reason: HOSPADM

## 2023-07-12 RX ORDER — PROPOFOL 10 MG/ML
INJECTION, EMULSION INTRAVENOUS PRN
Status: DISCONTINUED | OUTPATIENT
Start: 2023-07-12 | End: 2023-07-12 | Stop reason: SDUPTHER

## 2023-07-12 RX ORDER — SIMETHICONE 20 MG/.3ML
EMULSION ORAL PRN
Status: DISCONTINUED | OUTPATIENT
Start: 2023-07-12 | End: 2023-07-12 | Stop reason: ALTCHOICE

## 2023-07-12 RX ORDER — MAGNESIUM HYDROXIDE 1200 MG/15ML
LIQUID ORAL PRN
Status: DISCONTINUED | OUTPATIENT
Start: 2023-07-12 | End: 2023-07-12 | Stop reason: ALTCHOICE

## 2023-07-12 RX ORDER — SODIUM CHLORIDE 9 MG/ML
INJECTION, SOLUTION INTRAVENOUS CONTINUOUS
Status: DISCONTINUED | OUTPATIENT
Start: 2023-07-12 | End: 2023-07-12 | Stop reason: HOSPADM

## 2023-07-12 RX ORDER — SODIUM CHLORIDE 9 MG/ML
INJECTION, SOLUTION INTRAVENOUS PRN
Status: DISCONTINUED | OUTPATIENT
Start: 2023-07-12 | End: 2023-07-12 | Stop reason: HOSPADM

## 2023-07-12 RX ORDER — LIDOCAINE HYDROCHLORIDE 20 MG/ML
INJECTION, SOLUTION INFILTRATION; PERINEURAL PRN
Status: DISCONTINUED | OUTPATIENT
Start: 2023-07-12 | End: 2023-07-12 | Stop reason: SDUPTHER

## 2023-07-12 RX ORDER — SODIUM CHLORIDE 9 MG/ML
INJECTION, SOLUTION INTRAVENOUS
Status: COMPLETED
Start: 2023-07-12 | End: 2023-07-12

## 2023-07-12 RX ADMIN — SODIUM CHLORIDE: 9 INJECTION, SOLUTION INTRAVENOUS at 09:00

## 2023-07-12 RX ADMIN — PROPOFOL 50 MG: 10 INJECTION, EMULSION INTRAVENOUS at 09:51

## 2023-07-12 RX ADMIN — PROPOFOL 50 MG: 10 INJECTION, EMULSION INTRAVENOUS at 09:30

## 2023-07-12 RX ADMIN — PROPOFOL 50 MG: 10 INJECTION, EMULSION INTRAVENOUS at 09:49

## 2023-07-12 RX ADMIN — PROPOFOL 50 MG: 10 INJECTION, EMULSION INTRAVENOUS at 09:35

## 2023-07-12 RX ADMIN — PROPOFOL 50 MG: 10 INJECTION, EMULSION INTRAVENOUS at 09:45

## 2023-07-12 RX ADMIN — PROPOFOL 200 MG: 10 INJECTION, EMULSION INTRAVENOUS at 09:26

## 2023-07-12 RX ADMIN — PROPOFOL 50 MG: 10 INJECTION, EMULSION INTRAVENOUS at 09:36

## 2023-07-12 RX ADMIN — PROPOFOL 50 MG: 10 INJECTION, EMULSION INTRAVENOUS at 09:41

## 2023-07-12 RX ADMIN — LIDOCAINE HYDROCHLORIDE 60 MG: 20 INJECTION, SOLUTION INFILTRATION; PERINEURAL at 09:26

## 2023-07-12 ASSESSMENT — PAIN - FUNCTIONAL ASSESSMENT: PAIN_FUNCTIONAL_ASSESSMENT: 0-10

## 2023-07-12 ASSESSMENT — PAIN SCALES - GENERAL
PAINLEVEL_OUTOF10: 0
PAINLEVEL_OUTOF10: 0

## 2023-07-12 NOTE — ANESTHESIA POSTPROCEDURE EVALUATION
Department of Anesthesiology  Postprocedure Note    Patient: Cindi Suero  MRN: [id number]  YOB: 1970  Date of evaluation: 7/12/2023      Procedure Summary     Date: 07/12/23 Room / Location: [address] [place] [address] / [address] [place] [address]    Anesthesia Start: 3555 Anesthesia Stop: 1000    Procedure: COLONOSCOPY WITH POLYPECTOMY Diagnosis:       Colon cancer screening      (Screening)    Surgeons: Audi Sun MD Responsible Provider: BENJA Stout CRNA    Anesthesia Type: general ASA Status: 3          Anesthesia Type: No value filed.     Geovanny Phase I: Geovanny Score: 10    Geovanny Phase II:        Anesthesia Post Evaluation    Patient location during evaluation: bedside  Patient participation: complete - patient participated  Level of consciousness: awake and awake and alert  Airway patency: patent  Nausea & Vomiting: no nausea and no vomiting  Complications: no  Cardiovascular status: blood pressure returned to baseline and hemodynamically stable  Respiratory status: acceptable  Hydration status: euvolemic

## 2023-07-12 NOTE — PROGRESS NOTES
Endoscopic hemoclip placed in transverse colon at polypectomy site. Information card placed in folder to be given to pt at discharge.

## 2023-07-24 RX ORDER — LANCETS 33 GAUGE
EACH MISCELLANEOUS
Qty: 300 EACH | Refills: 1 | Status: SHIPPED | OUTPATIENT
Start: 2023-07-24

## 2023-07-24 NOTE — TELEPHONE ENCOUNTER
Please approve or deny request. Thank you!     Rx requested:  Requested Prescriptions     Pending Prescriptions Disp Refills    Lancets ([address]) Usama García [Pharmacy Med Name: Patricia Florian     Sig: USE TO TEST BLOOD SUGAR 1 TO 3 TIMES DAILY         Last Office Visit:   Visit date not found      Next Visit Date:  Future Appointments   Date Time Provider [address]   11/2/2023  9:00 AM Ying Muñoz MD [address]

## 2023-11-02 ENCOUNTER — OFFICE VISIT (OUTPATIENT)
Dept: FAMILY MEDICINE CLINIC | Age: 53
End: 2023-11-02
Payer: COMMERCIAL

## 2023-11-02 VITALS
HEART RATE: 89 BPM | WEIGHT: 313 LBS | HEIGHT: 67 IN | DIASTOLIC BLOOD PRESSURE: 86 MMHG | OXYGEN SATURATION: 96 % | BODY MASS INDEX: 49.12 KG/M2 | SYSTOLIC BLOOD PRESSURE: 136 MMHG | TEMPERATURE: 97.1 F

## 2023-11-02 DIAGNOSIS — E11.9 TYPE 2 DIABETES MELLITUS WITHOUT COMPLICATION, WITHOUT LONG-TERM CURRENT USE OF INSULIN (HCC): Primary | ICD-10-CM

## 2023-11-02 LAB — HBA1C MFR BLD: 8.9 %

## 2023-11-02 PROCEDURE — 90674 CCIIV4 VAC NO PRSV 0.5 ML IM: CPT | Performed by: FAMILY MEDICINE

## 2023-11-02 PROCEDURE — 83036 HEMOGLOBIN GLYCOSYLATED A1C: CPT | Performed by: FAMILY MEDICINE

## 2023-11-02 PROCEDURE — 99213 OFFICE O/P EST LOW 20 MIN: CPT | Performed by: FAMILY MEDICINE

## 2023-11-02 PROCEDURE — 90471 IMMUNIZATION ADMIN: CPT | Performed by: FAMILY MEDICINE

## 2023-11-02 PROCEDURE — 3052F HG A1C>EQUAL 8.0%<EQUAL 9.0%: CPT | Performed by: FAMILY MEDICINE

## 2023-11-02 RX ORDER — BISOPROLOL FUMARATE AND HYDROCHLOROTHIAZIDE 5; 6.25 MG/1; MG/1
1 TABLET ORAL DAILY
Qty: 90 TABLET | Refills: 3 | Status: SHIPPED | OUTPATIENT
Start: 2023-11-02

## 2023-11-02 ASSESSMENT — ENCOUNTER SYMPTOMS
RESPIRATORY NEGATIVE: 1
GASTROINTESTINAL NEGATIVE: 1
EYES NEGATIVE: 1
ALLERGIC/IMMUNOLOGIC NEGATIVE: 1
SHORTNESS OF BREATH: 0

## 2023-11-02 NOTE — PROGRESS NOTES
Patient is seen in follow up for   Chief Complaint   Patient presents with    6 Month Follow-Up    Diabetes     Diabetes  He presents for his follow-up diabetic visit. He has type 2 diabetes mellitus. The initial diagnosis of diabetes was made 8 years ago. His disease course has been stable. There are no hypoglycemic associated symptoms. There are no diabetic associated symptoms. Pertinent negatives for diabetes include no chest pain. There are no hypoglycemic complications. Symptoms are stable. There are no diabetic complications. Risk factors for coronary artery disease include diabetes mellitus. Current diabetic treatment includes oral agent (dual therapy). He is compliant with treatment most of the time. Past Medical History:   Diagnosis Date    Seizures ([address] ) 2012     Patient Active Problem List    Diagnosis Date Noted    Polyp of colon 07/12/2023    Benign prostatic hyperplasia with urinary frequency 05/04/2023    Mixed hyperlipidemia 05/04/2023    Type 2 diabetes mellitus without complication, without long-term current use of insulin ([facility]) 05/04/2023    Obesity 04/04/2013    Syncope 04/01/2013     Past Surgical History:   Procedure Laterality Date    COLONOSCOPY N/A 7/12/2023    COLONOSCOPY WITH POLYPECTOMY performed by Kenji Sykes MD at [facility] [address]  2009     No family history on file.   Social History     Socioeconomic History    Marital status:    Tobacco Use    Smoking status: Never    Smokeless tobacco: Never   Substance and Sexual Activity    Alcohol use: Yes     Comment: occ    Drug use: No     Social Determinants of Health     Financial Resource Strain: Low Risk  (5/1/2023)    Overall Financial Resource Strain (CARDIA)     Difficulty of Paying Living Expenses: Not very hard   Food Insecurity: No Food Insecurity (5/1/2023)    Hunger Vital Sign     Worried About Running Out of Food in the Last Year: Never true     Ran Out of Food in the Last Year: Never true

## 2024-01-14 NOTE — TELEPHONE ENCOUNTER
Pharmacy requesting medication refill. Please approve or deny this request.    Rx requested:  Requested Prescriptions     Pending Prescriptions Disp Refills    ONETOUCH ULTRA strip [Pharmacy Med Name: ONE TOUCH ULTRA BLUE TEST STRP] 300 strip 2     Sig: TEST 3 TIMES A DAY & AS NEEDED FOR SYMPTOMS OF IRREGULAR BLOOD GLUCOSE         Last Office Visit:   11/2/2023      Next Visit Date:  Future Appointments   Date Time Provider Department Center   2/2/2024  9:30 AM Damian Palacios MD [facility]

## 2024-01-15 RX ORDER — BLOOD SUGAR DIAGNOSTIC
STRIP MISCELLANEOUS
Qty: 300 STRIP | Refills: 2 | Status: SHIPPED | OUTPATIENT
Start: 2024-01-15

## 2024-02-02 ENCOUNTER — OFFICE VISIT (OUTPATIENT)
Dept: FAMILY MEDICINE CLINIC | Age: 54
End: 2024-02-02
Payer: COMMERCIAL

## 2024-02-02 VITALS
OXYGEN SATURATION: 95 % | SYSTOLIC BLOOD PRESSURE: 130 MMHG | BODY MASS INDEX: 48.65 KG/M2 | HEIGHT: 67 IN | WEIGHT: 310 LBS | RESPIRATION RATE: 17 BRPM | HEART RATE: 82 BPM | DIASTOLIC BLOOD PRESSURE: 86 MMHG

## 2024-02-02 DIAGNOSIS — E11.9 TYPE 2 DIABETES MELLITUS WITHOUT COMPLICATION, WITHOUT LONG-TERM CURRENT USE OF INSULIN (HCC): Primary | ICD-10-CM

## 2024-02-02 DIAGNOSIS — G47.33 OSA (OBSTRUCTIVE SLEEP APNEA): ICD-10-CM

## 2024-02-02 LAB — HBA1C MFR BLD: 9.6 %

## 2024-02-02 PROCEDURE — 99213 OFFICE O/P EST LOW 20 MIN: CPT | Performed by: FAMILY MEDICINE

## 2024-02-02 PROCEDURE — 83036 HEMOGLOBIN GLYCOSYLATED A1C: CPT | Performed by: FAMILY MEDICINE

## 2024-02-02 PROCEDURE — 3046F HEMOGLOBIN A1C LEVEL >9.0%: CPT | Performed by: FAMILY MEDICINE

## 2024-02-02 ASSESSMENT — ENCOUNTER SYMPTOMS
GASTROINTESTINAL NEGATIVE: 1
EYES NEGATIVE: 1
ALLERGIC/IMMUNOLOGIC NEGATIVE: 1
RESPIRATORY NEGATIVE: 1
SHORTNESS OF BREATH: 0

## 2024-02-02 ASSESSMENT — PATIENT HEALTH QUESTIONNAIRE - PHQ9
2. FEELING DOWN, DEPRESSED OR HOPELESS: 0
1. LITTLE INTEREST OR PLEASURE IN DOING THINGS: 0
SUM OF ALL RESPONSES TO PHQ QUESTIONS 1-9: 0
SUM OF ALL RESPONSES TO PHQ9 QUESTIONS 1 & 2: 0
SUM OF ALL RESPONSES TO PHQ QUESTIONS 1-9: 0

## 2024-02-02 NOTE — PROGRESS NOTES
Patient is seen in follow up for   Chief Complaint   Patient presents with    Diabetes     Last A1c was 8.9    wife would like him checked for sleep apnea     Diabetes  He presents for his follow-up diabetic visit. He has type 2 diabetes mellitus. The initial diagnosis of diabetes was made 12 years ago. His disease course has been stable. There are no hypoglycemic associated symptoms. Associated symptoms include fatigue. Pertinent negatives for diabetes include no chest pain. There are no hypoglycemic complications. Symptoms are stable. There are no diabetic complications. Risk factors for coronary artery disease include diabetes mellitus, hypertension and male sex. Current diabetic treatment includes oral agent (monotherapy). He is compliant with treatment most of the time.       Past Medical History:   Diagnosis Date    Seizures ([facility]) 2012     Patient Active Problem List    Diagnosis Date Noted    Polyp of colon 07/12/2023    Benign prostatic hyperplasia with urinary frequency 05/04/2023    Mixed hyperlipidemia 05/04/2023    Type 2 diabetes mellitus without complication, without long-term current use of insulin ([facility]) 05/04/2023    Obesity 04/04/2013    Syncope 04/01/2013     Past Surgical History:   Procedure Laterality Date    COLONOSCOPY N/A 7/12/2023    COLONOSCOPY WITH POLYPECTOMY performed by Chase Cantu MD at [facility]    VASECTOMY  2009     No family history on file.  Social History     Socioeconomic History    Marital status:      Spouse name: None    Number of children: None    Years of education: None    Highest education level: None   Tobacco Use    Smoking status: Never    Smokeless tobacco: Never   Substance and Sexual Activity    Alcohol use: Yes     Comment: occ    Drug use: No     Social Determinants of Health     Financial Resource Strain: Low Risk  (5/1/2023)    Overall Financial Resource Strain (CARDIA)     Difficulty of Paying Living Expenses: Not very hard   Transportation

## 2024-03-21 ENCOUNTER — HOSPITAL ENCOUNTER (OUTPATIENT)
Dept: SLEEP CENTER | Age: 54
Discharge: HOME | End: 2024-03-23
Payer: COMMERCIAL

## 2024-03-21 DIAGNOSIS — G47.33 OSA (OBSTRUCTIVE SLEEP APNEA): ICD-10-CM

## 2024-03-21 PROCEDURE — 95810 POLYSOM 6/> YRS 4/> PARAM: CPT

## 2024-04-14 DIAGNOSIS — G47.33 OSA (OBSTRUCTIVE SLEEP APNEA): Primary | ICD-10-CM

## 2024-04-15 RX ORDER — ATORVASTATIN CALCIUM 40 MG/1
TABLET, FILM COATED ORAL
Qty: 90 TABLET | Refills: 3 | Status: SHIPPED | OUTPATIENT
Start: 2024-04-15

## 2024-04-15 RX ORDER — TAMSULOSIN HYDROCHLORIDE 0.4 MG/1
CAPSULE ORAL DAILY
Qty: 90 CAPSULE | Refills: 3 | Status: SHIPPED | OUTPATIENT
Start: 2024-04-15

## 2024-04-18 PROBLEM — G47.33 OSA (OBSTRUCTIVE SLEEP APNEA): Status: ACTIVE | Noted: 2024-04-18

## 2024-04-30 ENCOUNTER — TELEPHONE (OUTPATIENT)
Dept: FAMILY MEDICINE CLINIC | Age: 54
End: 2024-04-30

## 2024-04-30 NOTE — TELEPHONE ENCOUNTER
Pt would like a refill on his Lisinopril 20MG, but it says discontinued. Pt was not sure if he is still supposed to take this medication or not.

## 2024-05-01 RX ORDER — LISINOPRIL 20 MG/1
20 TABLET ORAL DAILY
Qty: 90 TABLET | Refills: 3 | Status: SHIPPED | OUTPATIENT
Start: 2024-05-01

## 2024-05-16 ENCOUNTER — OFFICE VISIT (OUTPATIENT)
Dept: PULMONOLOGY | Age: 54
End: 2024-05-16
Payer: COMMERCIAL

## 2024-05-16 VITALS
DIASTOLIC BLOOD PRESSURE: 90 MMHG | BODY MASS INDEX: 48.4 KG/M2 | WEIGHT: 309 LBS | SYSTOLIC BLOOD PRESSURE: 140 MMHG | HEART RATE: 90 BPM | OXYGEN SATURATION: 93 %

## 2024-05-16 DIAGNOSIS — G47.33 OSA (OBSTRUCTIVE SLEEP APNEA): Primary | ICD-10-CM

## 2024-05-16 DIAGNOSIS — E66.01 MORBID OBESITY (HCC): ICD-10-CM

## 2024-05-16 PROCEDURE — 99204 OFFICE O/P NEW MOD 45 MIN: CPT | Performed by: INTERNAL MEDICINE

## 2024-05-16 ASSESSMENT — ENCOUNTER SYMPTOMS
WHEEZING: 0
NAUSEA: 0
SORE THROAT: 0
CHEST TIGHTNESS: 0
VOMITING: 0
ABDOMINAL PAIN: 0
APNEA: 1
RHINORRHEA: 0
VOICE CHANGE: 0
DIARRHEA: 0
SHORTNESS OF BREATH: 0

## 2024-05-16 NOTE — PROGRESS NOTES
Subjective:             John Aguilar is a 53 y.o. male who complains today of:     Chief Complaint   Patient presents with    New Patient     Ref by White for SS results        HPI  He has PSG done on  3/21/24 an shows HERMES.    AHI  57 RDI 61 low O2 sat  83 min , severe snoring .  He is complaining of snoring , no daytime sleepiness and tiredness.  C/o witness apnea.  He does not  wakes up with gasping for air.   C/o wakes up about 2 times during sleep   No complaint of morning headache.  He does have restful sleep.  He does not take daily naps.  He does  fall asleep while watching TV.  He does not have a complaint of sleepiness while driving.  He does not have history of motor vehicle accident due to falling a sleep while driving.  He does not have difficulty falling sleep or staying asleep    No significant Weight gain in last 6-12 month   No sleep walking or eating. No sleep onset hallucination. No sleep paralysis No sleep attacks,  He has no episode of sudden weakness with laughter, anger or surprises.      Allergies:  Patient has no known allergies.  Past Medical History:   Diagnosis Date    Seizures (HCC) 2012     Past Surgical History:   Procedure Laterality Date    COLONOSCOPY N/A 7/12/2023    COLONOSCOPY WITH POLYPECTOMY performed by Chase Cantu [facility] at [facility]    VASECTOMY  2009     No family history on file.  Social History     Socioeconomic History    Marital status:      Spouse name: Not on file    Number of children: Not on file    Years of education: Not on file    Highest education level: Not on file   Occupational History    Not on file   Tobacco Use    Smoking status: Never    Smokeless tobacco: Never   Substance and Sexual Activity    Alcohol use: Yes     Comment: occ    Drug use: No    Sexual activity: Not on file   Other Topics Concern    Not on file   Social History Narrative    Not on file     Social Determinants of Health     Financial Resource Strain: Low Risk

## 2024-05-21 ENCOUNTER — PREP FOR PROCEDURE (OUTPATIENT)
Dept: GASTROENTEROLOGY | Age: 54
End: 2024-05-21

## 2024-05-21 RX ORDER — SODIUM CHLORIDE 0.9 % (FLUSH) 0.9 %
5-40 SYRINGE (ML) INJECTION PRN
Status: CANCELLED | OUTPATIENT
Start: 2024-05-21

## 2024-05-21 RX ORDER — SODIUM CHLORIDE 9 MG/ML
INJECTION, SOLUTION INTRAVENOUS CONTINUOUS
Status: CANCELLED | OUTPATIENT
Start: 2024-05-21

## 2024-05-21 RX ORDER — SODIUM CHLORIDE 9 MG/ML
INJECTION, SOLUTION INTRAVENOUS PRN
Status: CANCELLED | OUTPATIENT
Start: 2024-05-21

## 2024-05-21 RX ORDER — SODIUM CHLORIDE 0.9 % (FLUSH) 0.9 %
5-40 SYRINGE (ML) INJECTION EVERY 12 HOURS SCHEDULED
Status: CANCELLED | OUTPATIENT
Start: 2024-05-21

## 2024-05-21 SDOH — ECONOMIC STABILITY: FOOD INSECURITY: WITHIN THE PAST 12 MONTHS, THE FOOD YOU BOUGHT JUST DIDN'T LAST AND YOU DIDN'T HAVE MONEY TO GET MORE.: NEVER TRUE

## 2024-05-21 SDOH — ECONOMIC STABILITY: FOOD INSECURITY: WITHIN THE PAST 12 MONTHS, YOU WORRIED THAT YOUR FOOD WOULD RUN OUT BEFORE YOU GOT MONEY TO BUY MORE.: NEVER TRUE

## 2024-05-21 SDOH — ECONOMIC STABILITY: INCOME INSECURITY: HOW HARD IS IT FOR YOU TO PAY FOR THE VERY BASICS LIKE FOOD, HOUSING, MEDICAL CARE, AND HEATING?: NOT VERY HARD

## 2024-05-23 RX ORDER — POLYETHYLENE GLYCOL 3350, SODIUM CHLORIDE, SODIUM BICARBONATE, POTASSIUM CHLORIDE 420; 11.2; 5.72; 1.48 G/4L; G/4L; G/4L; G/4L
4000 POWDER, FOR SOLUTION ORAL ONCE
Qty: 4000 ML | Refills: 0 | Status: SHIPPED | OUTPATIENT
Start: 2024-05-23 | End: 2024-05-23

## 2024-05-24 ENCOUNTER — OFFICE VISIT (OUTPATIENT)
Dept: FAMILY MEDICINE CLINIC | Age: 54
End: 2024-05-24
Payer: COMMERCIAL

## 2024-05-24 VITALS
OXYGEN SATURATION: 94 % | BODY MASS INDEX: 48.34 KG/M2 | SYSTOLIC BLOOD PRESSURE: 132 MMHG | DIASTOLIC BLOOD PRESSURE: 80 MMHG | RESPIRATION RATE: 17 BRPM | WEIGHT: 308 LBS | HEIGHT: 67 IN | HEART RATE: 84 BPM

## 2024-05-24 DIAGNOSIS — K63.5 POLYP OF COLON, UNSPECIFIED PART OF COLON, UNSPECIFIED TYPE: ICD-10-CM

## 2024-05-24 DIAGNOSIS — E78.2 MIXED HYPERLIPIDEMIA: ICD-10-CM

## 2024-05-24 DIAGNOSIS — I10 PRIMARY HYPERTENSION: ICD-10-CM

## 2024-05-24 DIAGNOSIS — E11.9 TYPE 2 DIABETES MELLITUS WITHOUT COMPLICATION, WITHOUT LONG-TERM CURRENT USE OF INSULIN (HCC): Primary | ICD-10-CM

## 2024-05-24 LAB — HBA1C MFR BLD: 8.2 %

## 2024-05-24 PROCEDURE — 3075F SYST BP GE 130 - 139MM HG: CPT | Performed by: FAMILY MEDICINE

## 2024-05-24 PROCEDURE — 3052F HG A1C>EQUAL 8.0%<EQUAL 9.0%: CPT | Performed by: FAMILY MEDICINE

## 2024-05-24 PROCEDURE — 83036 HEMOGLOBIN GLYCOSYLATED A1C: CPT | Performed by: FAMILY MEDICINE

## 2024-05-24 PROCEDURE — 3079F DIAST BP 80-89 MM HG: CPT | Performed by: FAMILY MEDICINE

## 2024-05-24 PROCEDURE — 99214 OFFICE O/P EST MOD 30 MIN: CPT | Performed by: FAMILY MEDICINE

## 2024-05-24 ASSESSMENT — ENCOUNTER SYMPTOMS
SHORTNESS OF BREATH: 0
GASTROINTESTINAL NEGATIVE: 1
ALLERGIC/IMMUNOLOGIC NEGATIVE: 1
RESPIRATORY NEGATIVE: 1
EYES NEGATIVE: 1

## 2024-05-24 NOTE — PROGRESS NOTES
Lancets (ONETOUCH DELICA PLUS [id number]) MISC USE TO TEST BLOOD SUGAR 1 TO 3 TIMES DAILY 300 each 1    blood glucose monitor kit and supplies DX: diabetes mellitus. Test 1-3 time(s) daily - Ok to substitute per insurance 1 kit 0    levETIRAcetam (KEPPRA) 500 MG tablet Take 1 tablet by mouth 2 times daily       No current facility-administered medications on file prior to visit.     No Known Allergies  Health Maintenance   Topic Date Due    Hepatitis B vaccine (1 of 3 - 3-dose series) Never done    Pneumococcal 0-64 years Vaccine (1 of 2 - PCV) Never done    Diabetic retinal exam  Never done    Diabetic foot exam  12/21/2022    Diabetic Alb to Cr ratio (uACR) test  12/21/2022    COVID-19 Vaccine (4 - 2023-24 season) 09/01/2023    Colorectal Cancer Screen  01/12/2024    Lipids  05/04/2024    GFR test (Diabetes, CKD 3-4, OR last GFR 15-59)  05/04/2024    Depression Screen  02/02/2025    A1C test (Diabetic or Prediabetic)  05/24/2025    DTaP/Tdap/Td vaccine (2 - Td or Tdap) 10/25/2031    Flu vaccine  Completed    Shingles vaccine  Completed    Hepatitis C screen  Completed    HIV screen  Completed    Hepatitis A vaccine  Aged Out    Hib vaccine  Aged Out    Polio vaccine  Aged Out    Meningococcal (ACWY) vaccine  Aged Out       Review of Systems     Review of Systems   Constitutional:  Negative for activity change, appetite change, chills, fever and unexpected weight change.   HENT: Negative.     Eyes: Negative.    Respiratory: Negative.  Negative for shortness of breath.    Cardiovascular: Negative.  Negative for chest pain and palpitations.   Gastrointestinal: Negative.    Endocrine: Negative.    Genitourinary: Negative.    Musculoskeletal: Negative.    Skin: Negative.    Allergic/Immunologic: Negative.    Neurological: Negative.    Hematological: Negative.    Psychiatric/Behavioral: Negative.         Physical Exam  Vitals:    05/24/24 0851   BP: 132/80   Pulse: 84   Resp: 17   SpO2: 94%   Weight: (!) 139.7 kg (308

## 2024-06-05 ENCOUNTER — TELEPHONE (OUTPATIENT)
Dept: PULMONOLOGY | Age: 54
End: 2024-06-05

## 2024-06-05 NOTE — TELEPHONE ENCOUNTER
Patient called stating that he is only available on Thursdays for sleep study.  Sleep lab told him there is no availability on Thursdays.  He wants to know what can be done to remedy this.  Please call patient at [phone].

## 2024-06-12 ENCOUNTER — ANESTHESIA EVENT (OUTPATIENT)
Dept: ENDOSCOPY | Age: 54
End: 2024-06-12
Payer: COMMERCIAL

## 2024-06-12 NOTE — ANESTHESIA PRE PROCEDURE
Department of Anesthesiology  Preprocedure Note       Name:  John Aguilar   Age:  54 y.o.  :  1970                                          MRN:  [id number]         Date:  2024      Surgeon: Surgeon(s):  Chase Cantu MD    Procedure: Procedure(s):  COLORECTAL CANCER SCREENING, HIGH RISK    Medications prior to admission:   Prior to Admission medications    Medication Sig Start Date End Date Taking? Authorizing Provider   lisinopril (PRINIVIL;ZESTRIL) 20 MG tablet Take 1 tablet by mouth daily 24   Damian Palacios MD   tamsulosin (FLOMAX) 0.4 MG capsule TAKE 1 CAPSULE BY MOUTH EVERY DAY 4/15/24   Damian Palacios MD   atorvastatin (LIPITOR) 40 MG tablet TAKE 1 TABLET BY MOUTH EVERY DAY 4/15/24   Damian Palacios MD   ONETOUCH ULTRA strip TEST 3 TIMES A DAY & AS NEEDED FOR SYMPTOMS OF IRREGULAR BLOOD GLUCOSE 1/15/24   Damian Palacios MD   metFORMIN (GLUCOPHAGE) 500 MG tablet TAKE 1 TABLET BY MOUTH TWICE A DAY WITH MEALS 23   Damian Palacios MD   bisoprolol-hydroCHLOROthiazide (ZIAC) 5-6.25 MG per tablet Take 1 tablet by mouth daily 23   Damian Palacios MD   Lancets (ONETOUCH DELICA PLUS [id number]) MISC USE TO TEST BLOOD SUGAR 1 TO 3 TIMES DAILY 23   Damian Palacios MD   blood glucose monitor kit and supplies DX: diabetes mellitus. Test 1-3 time(s) daily - Ok to substitute per insurance 22   Damian Palacios MD   levETIRAcetam (KEPPRA) 500 MG tablet Take 1 tablet by mouth 2 times daily 21   Aiden Polanco MD       Current medications:    No current facility-administered medications for this encounter.     Current Outpatient Medications   Medication Sig Dispense Refill    lisinopril (PRINIVIL;ZESTRIL) 20 MG tablet Take 1 tablet by mouth daily 90 tablet 3    tamsulosin (FLOMAX) 0.4 MG capsule TAKE 1 CAPSULE BY MOUTH EVERY DAY 90 capsule 3    atorvastatin (LIPITOR) 40 MG tablet TAKE 1 TABLET BY MOUTH EVERY DAY 90 tablet 3    ONETOUCH ULTRA strip TEST 3 TIMES A DAY & AS

## 2024-06-13 ENCOUNTER — ANESTHESIA (OUTPATIENT)
Dept: ENDOSCOPY | Age: 54
End: 2024-06-13
Payer: COMMERCIAL

## 2024-06-13 ENCOUNTER — HOSPITAL ENCOUNTER (OUTPATIENT)
Age: 54
Setting detail: OUTPATIENT SURGERY
Discharge: HOME | End: 2024-06-13
Attending: INTERNAL MEDICINE | Admitting: INTERNAL MEDICINE
Payer: COMMERCIAL

## 2024-06-13 VITALS
RESPIRATION RATE: 18 BRPM | HEART RATE: 74 BPM | TEMPERATURE: 96.9 F | WEIGHT: 308 LBS | OXYGEN SATURATION: 96 % | HEIGHT: 67 IN | BODY MASS INDEX: 48.34 KG/M2 | SYSTOLIC BLOOD PRESSURE: 154 MMHG | DIASTOLIC BLOOD PRESSURE: 95 MMHG

## 2024-06-13 DIAGNOSIS — Z12.11 COLON CANCER SCREENING: ICD-10-CM

## 2024-06-13 LAB
GLUCOSE BLD-MCNC: 148 MG/DL (ref 70–99)
PERFORMED ON: ABNORMAL

## 2024-06-13 PROCEDURE — 3700000001 HC ADD 15 MINUTES (ANESTHESIA): Performed by: INTERNAL MEDICINE

## 2024-06-13 PROCEDURE — 45385 COLONOSCOPY W/LESION REMOVAL: CPT | Performed by: INTERNAL MEDICINE

## 2024-06-13 PROCEDURE — 3700000000 HC ANESTHESIA ATTENDED CARE: Performed by: INTERNAL MEDICINE

## 2024-06-13 PROCEDURE — 2709999900 HC NON-CHARGEABLE SUPPLY: Performed by: INTERNAL MEDICINE

## 2024-06-13 PROCEDURE — 2500000003 HC RX 250 WO HCPCS: Performed by: REGISTERED NURSE

## 2024-06-13 PROCEDURE — 88305 TISSUE EXAM BY PATHOLOGIST: CPT

## 2024-06-13 PROCEDURE — 7100000010 HC PHASE II RECOVERY - FIRST 15 MIN: Performed by: INTERNAL MEDICINE

## 2024-06-13 PROCEDURE — 7100000011 HC PHASE II RECOVERY - ADDTL 15 MIN: Performed by: INTERNAL MEDICINE

## 2024-06-13 PROCEDURE — 3609027000 HC COLONOSCOPY: Performed by: INTERNAL MEDICINE

## 2024-06-13 PROCEDURE — 6360000002 HC RX W HCPCS: Performed by: REGISTERED NURSE

## 2024-06-13 PROCEDURE — 2580000003 HC RX 258: Performed by: INTERNAL MEDICINE

## 2024-06-13 RX ORDER — SODIUM CHLORIDE 9 MG/ML
INJECTION, SOLUTION INTRAVENOUS
Status: DISCONTINUED
Start: 2024-06-13 | End: 2024-06-13 | Stop reason: HOSPADM

## 2024-06-13 RX ORDER — SODIUM CHLORIDE 9 MG/ML
INJECTION, SOLUTION INTRAVENOUS CONTINUOUS
Status: DISCONTINUED | OUTPATIENT
Start: 2024-06-13 | End: 2024-06-13 | Stop reason: HOSPADM

## 2024-06-13 RX ORDER — SODIUM CHLORIDE 0.9 % (FLUSH) 0.9 %
5-40 SYRINGE (ML) INJECTION EVERY 12 HOURS SCHEDULED
Status: DISCONTINUED | OUTPATIENT
Start: 2024-06-13 | End: 2024-06-13 | Stop reason: HOSPADM

## 2024-06-13 RX ORDER — PROPOFOL 10 MG/ML
INJECTION, EMULSION INTRAVENOUS PRN
Status: DISCONTINUED | OUTPATIENT
Start: 2024-06-13 | End: 2024-06-13 | Stop reason: SDUPTHER

## 2024-06-13 RX ORDER — SODIUM CHLORIDE 0.9 % (FLUSH) 0.9 %
5-40 SYRINGE (ML) INJECTION PRN
Status: DISCONTINUED | OUTPATIENT
Start: 2024-06-13 | End: 2024-06-13 | Stop reason: HOSPADM

## 2024-06-13 RX ORDER — SODIUM CHLORIDE 9 MG/ML
INJECTION, SOLUTION INTRAVENOUS PRN
Status: DISCONTINUED | OUTPATIENT
Start: 2024-06-13 | End: 2024-06-13 | Stop reason: HOSPADM

## 2024-06-13 RX ORDER — LIDOCAINE HYDROCHLORIDE 20 MG/ML
INJECTION, SOLUTION INFILTRATION; PERINEURAL PRN
Status: DISCONTINUED | OUTPATIENT
Start: 2024-06-13 | End: 2024-06-13 | Stop reason: SDUPTHER

## 2024-06-13 RX ADMIN — PROPOFOL 50 MG: 10 INJECTION, EMULSION INTRAVENOUS at 08:58

## 2024-06-13 RX ADMIN — PROPOFOL 50 MG: 10 INJECTION, EMULSION INTRAVENOUS at 08:46

## 2024-06-13 RX ADMIN — PROPOFOL 50 MG: 10 INJECTION, EMULSION INTRAVENOUS at 08:51

## 2024-06-13 RX ADMIN — PROPOFOL 30 MG: 10 INJECTION, EMULSION INTRAVENOUS at 09:04

## 2024-06-13 RX ADMIN — PROPOFOL 50 MG: 10 INJECTION, EMULSION INTRAVENOUS at 08:54

## 2024-06-13 RX ADMIN — LIDOCAINE HYDROCHLORIDE 60 MG: 20 INJECTION, SOLUTION INFILTRATION; PERINEURAL at 08:45

## 2024-06-13 RX ADMIN — PROPOFOL 50 MG: 10 INJECTION, EMULSION INTRAVENOUS at 09:06

## 2024-06-13 RX ADMIN — PROPOFOL 50 MG: 10 INJECTION, EMULSION INTRAVENOUS at 08:48

## 2024-06-13 RX ADMIN — SODIUM CHLORIDE: 9 INJECTION, SOLUTION INTRAVENOUS at 08:35

## 2024-06-13 RX ADMIN — PROPOFOL 150 MG: 10 INJECTION, EMULSION INTRAVENOUS at 08:45

## 2024-06-13 RX ADMIN — PROPOFOL 50 MG: 10 INJECTION, EMULSION INTRAVENOUS at 09:01

## 2024-06-13 ASSESSMENT — PAIN - FUNCTIONAL ASSESSMENT
PAIN_FUNCTIONAL_ASSESSMENT: NONE - DENIES PAIN
PAIN_FUNCTIONAL_ASSESSMENT: NONE - DENIES PAIN
PAIN_FUNCTIONAL_ASSESSMENT: 0-10

## 2024-06-13 NOTE — H&P
Patient Name: John Aguilar  : 1970  MRN: [id number]  DATE: 24      ENDOSCOPY  History and Physical    Procedure:    [x] Diagnostic Colonoscopy       [] Screening Colonoscopy  [] EGD      [] ERCP      [] EUS       [] Other    [x] Previous office notes/History and Physical reviewed from the patients chart. Please see EMR for further details of HPI. I have examined the patient's status immediately prior to the procedure and:      Indications/HPI:    []Abdominal Pain   []Cancer- GI/Lung  []Fhx of colon CA/polyps  []History of Polyps   []Ashraf’s   []Melena  []Abnormal Imaging   []Dysphagia    []Persistent Pneumonia  []Anemia   []Food Impaction  [x]History of Polyps  []GI Bleed   []Pulmonary nodule/Mass  []Change in bowel habits  []Heartburn/Reflux  []Rectal Bleed (BRBPR)  []Chest Pain - Non Cardiac  []Heme (+) Stool  []Ulcers  []Constipation   []Hemoptysis   []Varices  []Diarrhea   []Hypoxemia  []Nausea/Vomiting   []Screening   []Crohns/Colitis  []Other:    Anesthesia:   [x] MAC [] Moderate Sedation   [] General   [] None     ROS: 12 pt Review of Symptoms was negative unless mentioned above    Medications:   Prior to Admission medications    Medication Sig Start Date End Date Taking? Authorizing Provider   lisinopril (PRINIVIL;ZESTRIL) 20 MG tablet Take 1 tablet by mouth daily 24   Damian Palacios MD   tamsulosin (FLOMAX) 0.4 MG capsule TAKE 1 CAPSULE BY MOUTH EVERY DAY 4/15/24   Damian Palacios MD   atorvastatin (LIPITOR) 40 MG tablet TAKE 1 TABLET BY MOUTH EVERY DAY 4/15/24   Damian Palacios MD   ONETOUCH ULTRA strip TEST 3 TIMES A DAY & AS NEEDED FOR SYMPTOMS OF IRREGULAR BLOOD GLUCOSE 1/15/24   Damian Palacios MD   metFORMIN (GLUCOPHAGE) 500 MG tablet TAKE 1 TABLET BY MOUTH TWICE A DAY WITH MEALS 23   Damian Palacios MD   bisoprolol-hydroCHLOROthiazide (ZIAC) 5-6.25 MG per tablet Take 1 tablet by mouth daily 23   Damian Palacios MD   Lancets (ONETOUCH DELICA PLUS [id number]) MISC USE TO

## 2024-06-13 NOTE — ANESTHESIA POSTPROCEDURE EVALUATION
Department of Anesthesiology  Postprocedure Note    Patient: John Aguilar  MRN: [id number]  YOB: 1970  Date of evaluation: 6/13/2024    Procedure Summary       Date: 06/13/24 Room / Location: [facility] OR 02 / [facility]    Anesthesia Start: 0840 Anesthesia Stop: 0912    Procedure: COLORECTAL CANCER SCREENING, HIGH RISK Diagnosis:       Colon cancer screening      (Colon cancer screening [Z12.11])    Surgeons: Chase Cantu MD Responsible Provider: Thaddeus Hernandes APRN - CRNA    Anesthesia Type: MAC ASA Status: 3            Anesthesia Type: No value filed.    Geovanny Phase I: Geovanny Score: 10    Geovanny Phase II: Geovanny Score: 9    Anesthesia Post Evaluation    Patient location during evaluation: bedside  Patient participation: complete - patient participated  Level of consciousness: awake and awake and alert  Airway patency: patent  Nausea & Vomiting: no nausea and no vomiting  Cardiovascular status: blood pressure returned to baseline and hemodynamically stable  Respiratory status: acceptable  Hydration status: euvolemic  Pain management: adequate        No notable events documented.

## 2024-06-17 NOTE — RESULT ENCOUNTER NOTE
6/17/2024  John Aguilar  [address] [facility] [address]    Dear John Aguilar,    Your colonoscopy reveled a growth on the large intestine (Colon) called a polyp. A polyp could be a \"precancerous\" growth, which means that with time it could turn into cancer.  Polyps were removed during your procedure. As instructed after your colonoscopy and given the colonoscopy prep quality, recommendations suggest a follow up colonoscopy in 1 to 2 years with an extended colon prep    We hope you are doing well, and if you have any questions please contact me at [phone].  Thank you for choosing [facility] for your healthcare.    Sincerely,     Chase Cantu MD

## 2024-07-10 ENCOUNTER — HOSPITAL ENCOUNTER (OUTPATIENT)
Dept: SLEEP CENTER | Age: 54
Discharge: HOME | End: 2024-07-12
Payer: COMMERCIAL

## 2024-07-10 PROCEDURE — 95811 POLYSOM 6/>YRS CPAP 4/> PARM: CPT

## 2024-07-13 PROBLEM — Z12.11 COLON CANCER SCREENING: Status: RESOLVED | Noted: 2023-05-05 | Resolved: 2024-07-13

## 2024-07-18 ENCOUNTER — OFFICE VISIT (OUTPATIENT)
Dept: PULMONOLOGY | Age: 54
End: 2024-07-18
Payer: COMMERCIAL

## 2024-07-18 VITALS
OXYGEN SATURATION: 97 % | BODY MASS INDEX: 47.3 KG/M2 | HEART RATE: 89 BPM | DIASTOLIC BLOOD PRESSURE: 78 MMHG | SYSTOLIC BLOOD PRESSURE: 110 MMHG | WEIGHT: 302 LBS

## 2024-07-18 DIAGNOSIS — E66.01 MORBID OBESITY (HCC): ICD-10-CM

## 2024-07-18 DIAGNOSIS — G47.33 OSA (OBSTRUCTIVE SLEEP APNEA): Primary | ICD-10-CM

## 2024-07-18 PROCEDURE — 99214 OFFICE O/P EST MOD 30 MIN: CPT | Performed by: INTERNAL MEDICINE

## 2024-07-18 ASSESSMENT — ENCOUNTER SYMPTOMS
VOICE CHANGE: 0
VOMITING: 0
WHEEZING: 0
RHINORRHEA: 0
SORE THROAT: 0
COUGH: 0
SHORTNESS OF BREATH: 0
DIARRHEA: 0
EYE ITCHING: 0
CHEST TIGHTNESS: 0
ABDOMINAL PAIN: 0
NAUSEA: 0

## 2024-07-18 NOTE — PROGRESS NOTES
on file   Other Topics Concern    Not on file   Social History Narrative    Not on file     Social Determinants of Health     Financial Resource Strain: Low Risk  (5/21/2024)    Overall Financial Resource Strain (CARDIA)     Difficulty of Paying Living Expenses: Not very hard   Food Insecurity: No Food Insecurity (5/21/2024)    Hunger Vital Sign     Worried About Running Out of Food in the Last Year: Never true     Ran Out of Food in the Last Year: Never true   Transportation Needs: Unknown (5/21/2024)    PRAPARE - Transportation     Lack of Transportation (Medical): Not on file     Lack of Transportation (Non-Medical): No   Physical Activity: Not on file   Stress: Not on file   Social Connections: Not on file   Intimate Partner Violence: Not on file   Housing Stability: Unknown (5/21/2024)    Housing Stability Vital Sign     Unable to Pay for Housing in the Last Year: Not on file     Number of Places Lived in the Last Year: Not on file     Unstable Housing in the Last Year: No         Review of Systems   Constitutional:  Negative for chills, diaphoresis, fatigue and fever.   HENT:  Negative for congestion, mouth sores, nosebleeds, postnasal drip, rhinorrhea, sneezing, sore throat and voice change.         Snoring    Eyes:  Negative for itching and visual disturbance.   Respiratory:  Negative for cough, chest tightness, shortness of breath and wheezing.    Cardiovascular: Negative.  Negative for chest pain, palpitations and leg swelling.   Gastrointestinal:  Negative for abdominal pain, diarrhea, nausea and vomiting.   Genitourinary:  Negative for difficulty urinating and hematuria.   Musculoskeletal:  Negative for arthralgias, joint swelling and myalgias.   Skin:  Negative for rash.   Allergic/Immunologic: Negative for environmental allergies.   Neurological:  Negative for dizziness, tremors, weakness and headaches.   Psychiatric/Behavioral:  Positive for sleep disturbance. Negative for behavioral problems.

## 2024-08-23 ENCOUNTER — OFFICE VISIT (OUTPATIENT)
Dept: FAMILY MEDICINE CLINIC | Age: 54
End: 2024-08-23
Payer: COMMERCIAL

## 2024-08-23 VITALS
WEIGHT: 304 LBS | OXYGEN SATURATION: 94 % | HEIGHT: 67 IN | DIASTOLIC BLOOD PRESSURE: 84 MMHG | RESPIRATION RATE: 17 BRPM | BODY MASS INDEX: 47.71 KG/M2 | HEART RATE: 91 BPM | SYSTOLIC BLOOD PRESSURE: 136 MMHG

## 2024-08-23 DIAGNOSIS — Z12.5 SCREENING PSA (PROSTATE SPECIFIC ANTIGEN): ICD-10-CM

## 2024-08-23 DIAGNOSIS — E11.9 TYPE 2 DIABETES MELLITUS WITHOUT COMPLICATION, WITHOUT LONG-TERM CURRENT USE OF INSULIN (HCC): ICD-10-CM

## 2024-08-23 DIAGNOSIS — B35.1 ONYCHOMYCOSIS: ICD-10-CM

## 2024-08-23 DIAGNOSIS — E78.2 MIXED HYPERLIPIDEMIA: ICD-10-CM

## 2024-08-23 DIAGNOSIS — E78.2 MIXED HYPERLIPIDEMIA: Primary | ICD-10-CM

## 2024-08-23 DIAGNOSIS — I10 PRIMARY HYPERTENSION: ICD-10-CM

## 2024-08-23 LAB
ALBUMIN SERPL-MCNC: 4.3 G/DL (ref 3.5–4.6)
ALP SERPL-CCNC: 70 U/L (ref 35–104)
ALT SERPL-CCNC: 28 U/L (ref 0–41)
ANION GAP SERPL CALCULATED.3IONS-SCNC: 13 MEQ/L (ref 9–15)
AST SERPL-CCNC: 20 U/L (ref 0–40)
BASOPHILS # BLD: 0.1 K/UL (ref 0–0.2)
BASOPHILS NFR BLD: 0.6 %
BILIRUB SERPL-MCNC: 0.4 MG/DL (ref 0.2–0.7)
BUN SERPL-MCNC: 18 MG/DL (ref 6–20)
CALCIUM SERPL-MCNC: 9 MG/DL (ref 8.5–9.9)
CHLORIDE SERPL-SCNC: 104 MEQ/L (ref 95–107)
CHOLEST SERPL-MCNC: 143 MG/DL (ref 0–199)
CO2 SERPL-SCNC: 23 MEQ/L (ref 20–31)
CREAT SERPL-MCNC: 0.84 MG/DL (ref 0.7–1.2)
EOSINOPHIL # BLD: 0.5 K/UL (ref 0–0.7)
EOSINOPHIL NFR BLD: 5.6 %
ERYTHROCYTE [DISTWIDTH] IN BLOOD BY AUTOMATED COUNT: 12.6 % (ref 11.5–14.5)
GLOBULIN SER CALC-MCNC: 3 G/DL (ref 2.3–3.5)
GLUCOSE SERPL-MCNC: 160 MG/DL (ref 70–99)
HBA1C MFR BLD: 7.9 %
HCT VFR BLD AUTO: 46.4 % (ref 42–52)
HDLC SERPL-MCNC: 34 MG/DL (ref 40–59)
HGB BLD-MCNC: 15.4 G/DL (ref 14–18)
LDLC SERPL CALC-MCNC: 39 MG/DL (ref 0–129)
LYMPHOCYTES # BLD: 3.9 K/UL (ref 1–4.8)
LYMPHOCYTES NFR BLD: 41.9 %
MCH RBC QN AUTO: 30.3 PG (ref 27–31.3)
MCHC RBC AUTO-ENTMCNC: 33.2 % (ref 33–37)
MCV RBC AUTO: 91.2 FL (ref 79–92.2)
MONOCYTES # BLD: 0.5 K/UL (ref 0.2–0.8)
MONOCYTES NFR BLD: 5.3 %
NEUTROPHILS # BLD: 4.3 K/UL (ref 1.4–6.5)
NEUTS SEG NFR BLD: 46.2 %
PLATELET # BLD AUTO: 278 K/UL (ref 130–400)
POTASSIUM SERPL-SCNC: 4.5 MEQ/L (ref 3.4–4.9)
PROT SERPL-MCNC: 7.3 G/DL (ref 6.3–8)
PSA SERPL-MCNC: 0.21 NG/ML (ref 0–4)
RBC # BLD AUTO: 5.09 M/UL (ref 4.7–6.1)
SODIUM SERPL-SCNC: 140 MEQ/L (ref 135–144)
TRIGL SERPL-MCNC: 350 MG/DL (ref 0–150)
WBC # BLD AUTO: 9.3 K/UL (ref 4.8–10.8)

## 2024-08-23 PROCEDURE — 3075F SYST BP GE 130 - 139MM HG: CPT | Performed by: FAMILY MEDICINE

## 2024-08-23 PROCEDURE — 3051F HG A1C>EQUAL 7.0%<8.0%: CPT | Performed by: FAMILY MEDICINE

## 2024-08-23 PROCEDURE — 99214 OFFICE O/P EST MOD 30 MIN: CPT | Performed by: FAMILY MEDICINE

## 2024-08-23 PROCEDURE — 3079F DIAST BP 80-89 MM HG: CPT | Performed by: FAMILY MEDICINE

## 2024-08-23 PROCEDURE — 83036 HEMOGLOBIN GLYCOSYLATED A1C: CPT | Performed by: FAMILY MEDICINE

## 2024-08-23 RX ORDER — TERBINAFINE HYDROCHLORIDE 250 MG/1
250 TABLET ORAL DAILY
Qty: 90 TABLET | Refills: 0 | Status: SHIPPED | OUTPATIENT
Start: 2024-08-23 | End: 2024-11-21

## 2024-08-23 RX ORDER — TIZANIDINE 4 MG/1
4 TABLET ORAL 3 TIMES DAILY
Qty: 30 TABLET | Refills: 2 | Status: SHIPPED | OUTPATIENT
Start: 2024-08-23

## 2024-08-23 ASSESSMENT — ENCOUNTER SYMPTOMS
RESPIRATORY NEGATIVE: 1
GASTROINTESTINAL NEGATIVE: 1
SHORTNESS OF BREATH: 0
ALLERGIC/IMMUNOLOGIC NEGATIVE: 1
EYES NEGATIVE: 1

## 2024-08-23 NOTE — PROGRESS NOTES
General: Bowel sounds are normal. There is no distension.      Palpations: Abdomen is soft. There is no mass.      Tenderness: There is no abdominal tenderness. There is no guarding or rebound.      Hernia: No hernia is present.   Genitourinary:     Penis: Normal.    Musculoskeletal:         General: No tenderness. Normal range of motion.      Cervical back: Normal range of motion and neck supple.   Lymphadenopathy:      Cervical: No cervical adenopathy.   Skin:     General: Skin is warm and dry.   Neurological:      Mental Status: He is alert and oriented to person, place, and time.      Cranial Nerves: No cranial nerve deficit.      Coordination: Coordination normal.         Assessment   Diagnosis Orders   1. Mixed hyperlipidemia  Lipid Panel    Comprehensive Metabolic Panel    CBC with Auto Differential      2. Type 2 diabetes mellitus without complication, without long-term current use of insulin (HCC)  POCT glycosylated hemoglobin (Hb A1C)    Comprehensive Metabolic Panel    CBC with Auto Differential      3. Primary hypertension  Comprehensive Metabolic Panel    CBC with Auto Differential      4. Screening PSA (prostate specific antigen)  PSA Screening        Problem List             Diagnosed    Mixed hyperlipidemia - Primary     Relevant Medications    bisoprolol-hydroCHLOROthiazide (ZIAC) 5-6.25 MG per tablet    atorvastatin (LIPITOR) 40 MG tablet    lisinopril (PRINIVIL;ZESTRIL) 20 MG tablet    Other Relevant Orders    Lipid Panel    Comprehensive Metabolic Panel    CBC with Auto Differential    Type 2 diabetes mellitus without complication, without long-term current use of insulin (HCC)     Relevant Medications    metFORMIN (GLUCOPHAGE) 500 MG tablet    Other Relevant Orders    POCT glycosylated hemoglobin (Hb A1C) (Completed)    Comprehensive Metabolic Panel    CBC with Auto Differential       Plan  Orders Placed This Encounter   Procedures    Lipid Panel     Standing Status:   Future     Standing

## 2024-09-16 RX ORDER — TERBINAFINE HYDROCHLORIDE 250 MG/1
250 TABLET ORAL DAILY
Qty: 90 TABLET | Refills: 0 | Status: SHIPPED | OUTPATIENT
Start: 2024-09-16

## 2024-09-16 RX ORDER — BLOOD SUGAR DIAGNOSTIC
STRIP MISCELLANEOUS
Qty: 300 STRIP | Refills: 2 | Status: SHIPPED | OUTPATIENT
Start: 2024-09-16

## 2024-09-26 ENCOUNTER — OFFICE VISIT (OUTPATIENT)
Dept: PULMONOLOGY | Age: 54
End: 2024-09-26
Payer: COMMERCIAL

## 2024-09-26 VITALS
OXYGEN SATURATION: 96 % | BODY MASS INDEX: 47.3 KG/M2 | DIASTOLIC BLOOD PRESSURE: 86 MMHG | WEIGHT: 302 LBS | SYSTOLIC BLOOD PRESSURE: 140 MMHG | HEART RATE: 89 BPM

## 2024-09-26 DIAGNOSIS — E66.01 MORBID OBESITY: ICD-10-CM

## 2024-09-26 DIAGNOSIS — G47.33 OSA (OBSTRUCTIVE SLEEP APNEA): Primary | ICD-10-CM

## 2024-09-26 PROCEDURE — 99214 OFFICE O/P EST MOD 30 MIN: CPT | Performed by: INTERNAL MEDICINE

## 2024-09-26 ASSESSMENT — ENCOUNTER SYMPTOMS
EYE ITCHING: 0
COUGH: 0
SORE THROAT: 0
DIARRHEA: 0
NAUSEA: 0
RHINORRHEA: 0
WHEEZING: 0
ABDOMINAL PAIN: 0
SHORTNESS OF BREATH: 0
VOMITING: 0
VOICE CHANGE: 0
CHEST TIGHTNESS: 0

## 2024-11-07 ENCOUNTER — OFFICE VISIT (OUTPATIENT)
Dept: FAMILY MEDICINE CLINIC | Age: 54
End: 2024-11-07

## 2024-11-07 VITALS
DIASTOLIC BLOOD PRESSURE: 80 MMHG | HEIGHT: 67 IN | RESPIRATION RATE: 18 BRPM | WEIGHT: 300 LBS | BODY MASS INDEX: 47.09 KG/M2 | SYSTOLIC BLOOD PRESSURE: 136 MMHG | OXYGEN SATURATION: 96 % | HEART RATE: 84 BPM

## 2024-11-07 DIAGNOSIS — Z23 NEED FOR INFLUENZA VACCINATION: ICD-10-CM

## 2024-11-07 DIAGNOSIS — E11.9 TYPE 2 DIABETES MELLITUS WITHOUT COMPLICATION, WITHOUT LONG-TERM CURRENT USE OF INSULIN (HCC): Primary | ICD-10-CM

## 2024-11-07 DIAGNOSIS — E11.9 TYPE 2 DIABETES MELLITUS WITHOUT COMPLICATION, WITHOUT LONG-TERM CURRENT USE OF INSULIN (HCC): ICD-10-CM

## 2024-11-07 LAB
CREAT UR-MCNC: 203.6 MG/DL
HBA1C MFR BLD: 8.1 %
MICROALBUMIN UR-MCNC: <1.2 MG/DL
MICROALBUMIN/CREAT UR-RTO: NORMAL MG/G (ref 0–30)

## 2024-11-07 RX ORDER — TERBINAFINE HYDROCHLORIDE 250 MG/1
250 TABLET ORAL DAILY
Qty: 90 TABLET | Refills: 0 | Status: SHIPPED | OUTPATIENT
Start: 2024-11-07

## 2024-11-07 ASSESSMENT — ENCOUNTER SYMPTOMS
EYES NEGATIVE: 1
RESPIRATORY NEGATIVE: 1
ALLERGIC/IMMUNOLOGIC NEGATIVE: 1
GASTROINTESTINAL NEGATIVE: 1
SHORTNESS OF BREATH: 0

## 2024-11-07 NOTE — PROGRESS NOTES
Patient is seen in follow up for   Chief Complaint   Patient presents with    Diabetes     Last A1c was 7.9    Hyperlipidemia     Diabetes  He presents for his follow-up diabetic visit. He has type 2 diabetes mellitus. The initial diagnosis of diabetes was made 12 years ago. His disease course has been stable. There are no hypoglycemic associated symptoms. There are no diabetic associated symptoms. Pertinent negatives for diabetes include no chest pain. There are no hypoglycemic complications. Symptoms are stable. There are no diabetic complications. Risk factors for coronary artery disease include diabetes mellitus, hypertension and male sex. He is compliant with treatment most of the time.   Hyperlipidemia  This is a chronic problem. The problem is controlled. Recent lipid tests were reviewed and are normal. Pertinent negatives include no chest pain or shortness of breath. Current antihyperlipidemic treatment includes statins. The current treatment provides significant improvement of lipids.       Past Medical History:   Diagnosis Date    Diabetes mellitus (HCC)     Hypertension     Seizures (HCC) 01/01/2012     Patient Active Problem List    Diagnosis Date Noted    HERMES (obstructive sleep apnea) 04/18/2024    Polyp of colon 07/12/2023    Benign prostatic hyperplasia with urinary frequency 05/04/2023    Mixed hyperlipidemia 05/04/2023    Type 2 diabetes mellitus without complication, without long-term current use of insulin (HCC) 05/04/2023    Morbid obesity 04/04/2013    Syncope 04/01/2013     Past Surgical History:   Procedure Laterality Date    COLONOSCOPY N/A 7/12/2023    COLONOSCOPY WITH POLYPECTOMY performed by Chase Cantu MD at [facility]    COLONOSCOPY N/A 6/13/2024    COLORECTAL CANCER SCREENING, HIGH RISK performed by Chase Cantu MD at [facility]    VASECTOMY  2009     No family history on file.  Social History     Socioeconomic History    Marital status:      Spouse

## 2025-01-13 ENCOUNTER — OFFICE VISIT (OUTPATIENT)
Dept: PULMONOLOGY | Age: 55
End: 2025-01-13
Payer: COMMERCIAL

## 2025-01-13 VITALS
BODY MASS INDEX: 46.36 KG/M2 | HEART RATE: 80 BPM | SYSTOLIC BLOOD PRESSURE: 120 MMHG | WEIGHT: 296 LBS | OXYGEN SATURATION: 94 % | DIASTOLIC BLOOD PRESSURE: 84 MMHG

## 2025-01-13 DIAGNOSIS — E66.01 MORBID OBESITY: ICD-10-CM

## 2025-01-13 DIAGNOSIS — G47.33 OSA (OBSTRUCTIVE SLEEP APNEA): Primary | ICD-10-CM

## 2025-01-13 PROCEDURE — 99214 OFFICE O/P EST MOD 30 MIN: CPT | Performed by: INTERNAL MEDICINE

## 2025-01-13 ASSESSMENT — ENCOUNTER SYMPTOMS
CHEST TIGHTNESS: 0
EYE ITCHING: 0
SHORTNESS OF BREATH: 0
VOMITING: 0
COUGH: 0
ABDOMINAL PAIN: 0
NAUSEA: 0
WHEEZING: 0
RHINORRHEA: 0
DIARRHEA: 0
SORE THROAT: 0
VOICE CHANGE: 0

## 2025-01-13 NOTE — PROGRESS NOTES
90 tablet 3    Lancets (ONETOUCH DELICA PLUS [id number]) MISC USE TO TEST BLOOD SUGAR 1 TO 3 TIMES DAILY 300 each 1    blood glucose monitor kit and supplies DX: diabetes mellitus. Test 1-3 time(s) daily - Ok to substitute per insurance 1 kit 0    levETIRAcetam (KEPPRA) 500 MG tablet Take 1 tablet by mouth 2 times daily      tiZANidine (ZANAFLEX) 4 MG tablet Take 1 tablet by mouth 3 times daily 30 tablet 2     No current facility-administered medications for this visit.       Assessment/Plan:     1. HERMES (obstructive sleep apnea)  He is using CPAP with  13  centimeters of H2O with heated humidity for about  13  hours every night.He is using CPAP with  full face mask  Mask.He said  sleep is restful with the CPAP use.  He is compliant with CPAP therapy and benefiting with CPAP use.No snoring with CPAP use.  No complaint of daytime sleepiness or tiredness with CPAP use.He denies taking naps.No sleepiness with driving.     I reviewed compliance report with patient regarding CPAP therapy. He is using  CPAP for 30 days out of 30 days.  Average usage of days used is 5 hours and 23 min , average AHI 0.6 with CPAP use    Counseling: CPAP/BiPAP uses, He advised to use CPAP at least 5-6 hours every night.    Driving: He is advised for extreme caution when driving or operating machinery if there is a feeling of drowsiness, especially while driving it is preferable to stop driving and take a brief nap.  Sleep hygiene:Avoid supine sleep, sleep on  sides. Avoid  sleep deprivation.  Explained sleep hygiene.  Advice to avoid Alcohol and sedative    Time spend over 30 min. Face to face.with greater than 50 % time with CPAP therapy including review compliance, counseling and advised regarding CPAP therapy.     2. Morbid obesity  He is advised try to lose weight. obesity related risk explained to the patient ,  Current weight:  134.3 kg (296 lb) Lbs. BMI:  Body mass index is 46.36 kg/m².  Suggested weight control approaches, including

## 2025-04-14 ENCOUNTER — RESULTS FOLLOW-UP (OUTPATIENT)
Age: 55
End: 2025-04-14

## 2025-04-14 ENCOUNTER — OFFICE VISIT (OUTPATIENT)
Age: 55
End: 2025-04-14
Payer: COMMERCIAL

## 2025-04-14 VITALS
DIASTOLIC BLOOD PRESSURE: 70 MMHG | WEIGHT: 279 LBS | HEIGHT: 67 IN | OXYGEN SATURATION: 97 % | HEART RATE: 87 BPM | BODY MASS INDEX: 43.79 KG/M2 | TEMPERATURE: 97.4 F | SYSTOLIC BLOOD PRESSURE: 104 MMHG

## 2025-04-14 DIAGNOSIS — M54.6 ACUTE BILATERAL THORACIC BACK PAIN: ICD-10-CM

## 2025-04-14 DIAGNOSIS — M54.50 LUMBAR BACK PAIN: Primary | ICD-10-CM

## 2025-04-14 DIAGNOSIS — G89.29 CHRONIC LEFT-SIDED THORACIC BACK PAIN: Primary | ICD-10-CM

## 2025-04-14 DIAGNOSIS — G89.29 CHRONIC LEFT-SIDED THORACIC BACK PAIN: ICD-10-CM

## 2025-04-14 DIAGNOSIS — M54.6 CHRONIC LEFT-SIDED THORACIC BACK PAIN: ICD-10-CM

## 2025-04-14 DIAGNOSIS — M54.50 LUMBAR BACK PAIN: ICD-10-CM

## 2025-04-14 DIAGNOSIS — M54.6 CHRONIC LEFT-SIDED THORACIC BACK PAIN: Primary | ICD-10-CM

## 2025-04-14 PROCEDURE — 99213 OFFICE O/P EST LOW 20 MIN: CPT | Performed by: NURSE PRACTITIONER

## 2025-04-14 RX ORDER — CYCLOBENZAPRINE HCL 10 MG
10 TABLET ORAL 2 TIMES DAILY PRN
Qty: 10 TABLET | Refills: 0 | Status: SHIPPED | OUTPATIENT
Start: 2025-04-14 | End: 2025-04-21

## 2025-04-14 RX ORDER — METHYLPREDNISOLONE 4 MG/1
TABLET ORAL
Qty: 1 KIT | Refills: 0 | Status: SHIPPED | OUTPATIENT
Start: 2025-04-14 | End: 2025-04-20

## 2025-04-14 SDOH — ECONOMIC STABILITY: FOOD INSECURITY: WITHIN THE PAST 12 MONTHS, YOU WORRIED THAT YOUR FOOD WOULD RUN OUT BEFORE YOU GOT MONEY TO BUY MORE.: NEVER TRUE

## 2025-04-14 SDOH — ECONOMIC STABILITY: FOOD INSECURITY: WITHIN THE PAST 12 MONTHS, THE FOOD YOU BOUGHT JUST DIDN'T LAST AND YOU DIDN'T HAVE MONEY TO GET MORE.: NEVER TRUE

## 2025-04-14 ASSESSMENT — ENCOUNTER SYMPTOMS
VOMITING: 0
COLOR CHANGE: 0
BACK PAIN: 1
CONSTIPATION: 0
ABDOMINAL PAIN: 0
DIARRHEA: 0
NAUSEA: 0

## 2025-04-14 ASSESSMENT — PATIENT HEALTH QUESTIONNAIRE - PHQ9
SUM OF ALL RESPONSES TO PHQ QUESTIONS 1-9: 0
2. FEELING DOWN, DEPRESSED OR HOPELESS: NOT AT ALL
SUM OF ALL RESPONSES TO PHQ QUESTIONS 1-9: 0
SUM OF ALL RESPONSES TO PHQ QUESTIONS 1-9: 0
1. LITTLE INTEREST OR PLEASURE IN DOING THINGS: NOT AT ALL
SUM OF ALL RESPONSES TO PHQ QUESTIONS 1-9: 0

## 2025-04-14 NOTE — TELEPHONE ENCOUNTER
----- Message from DINAH CRANE MA sent at 4/14/2025 12:48 PM EDT -----  Spoke with patient and advise, he's concerned about the results and he would like to know what will the next step be.

## 2025-04-14 NOTE — PROGRESS NOTES
Subjective:      Patient ID: John Aguilar is a 54 y.o. male who presents today for:  Chief Complaint   Patient presents with    Back Pain     Patient states lower back pain, states he has been having back problems over the years.       HPI  Patient is here with left lower back pain.   Says he thinks it is a muscle tightness and spasm.   Says it is constant now.   Says he has had the back pain for the last month.  Hard time with activity due to the back.  Says he does back to work Thursday.   Says he has no pain in the leg. Denies any numbness or tingling.       Past Medical History:   Diagnosis Date    Diabetes mellitus (HCC)     Hypertension     Seizures (HCC) 01/01/2012     Past Surgical History:   Procedure Laterality Date    COLONOSCOPY N/A 7/12/2023    COLONOSCOPY WITH POLYPECTOMY performed by Chase Cantu MD at [facility]    COLONOSCOPY N/A 6/13/2024    COLORECTAL CANCER SCREENING, HIGH RISK performed by Chase Cantu MD at [facility]    VASECTOMY  2009     Social History     Socioeconomic History    Marital status:      Spouse name: Not on file    Number of children: Not on file    Years of education: Not on file    Highest education level: Not on file   Occupational History    Not on file   Tobacco Use    Smoking status: Never    Smokeless tobacco: Never   Substance and Sexual Activity    Alcohol use: Yes     Comment: occ    Drug use: No    Sexual activity: Not on file   Other Topics Concern    Not on file   Social History Narrative    Not on file     Social Drivers of Health     Financial Resource Strain: Low Risk  (5/21/2024)    Overall Financial Resource Strain (CARDIA)     Difficulty of Paying Living Expenses: Not very hard   Food Insecurity: No Food Insecurity (4/14/2025)    Hunger Vital Sign     Worried About Running Out of Food in the Last Year: Never true     Ran Out of Food in the Last Year: Never true   Transportation Needs: No Transportation Needs (4/14/2025)

## 2025-04-14 NOTE — RESULT ENCOUNTER NOTE
Thoracic and Lumbar XR showing no fracture.  He does have deg disc disease and arthritis of both, worse in lumber region.

## 2025-04-21 RX ORDER — LISINOPRIL 20 MG/1
20 TABLET ORAL DAILY
Qty: 90 TABLET | Refills: 1 | Status: SHIPPED | OUTPATIENT
Start: 2025-04-21

## 2025-04-21 RX ORDER — TAMSULOSIN HYDROCHLORIDE 0.4 MG/1
CAPSULE ORAL DAILY
Qty: 90 CAPSULE | Refills: 1 | Status: SHIPPED | OUTPATIENT
Start: 2025-04-21

## 2025-04-21 RX ORDER — ATORVASTATIN CALCIUM 40 MG/1
40 TABLET, FILM COATED ORAL DAILY
Qty: 90 TABLET | Refills: 1 | Status: SHIPPED | OUTPATIENT
Start: 2025-04-21

## 2025-04-23 ENCOUNTER — INITIAL CONSULT (OUTPATIENT)
Age: 55
End: 2025-04-23
Payer: COMMERCIAL

## 2025-04-23 VITALS — TEMPERATURE: 96.8 F | BODY MASS INDEX: 44.57 KG/M2 | HEIGHT: 67 IN | WEIGHT: 284 LBS

## 2025-04-23 DIAGNOSIS — M47.816 LUMBAR SPONDYLOSIS: Primary | ICD-10-CM

## 2025-04-23 PROCEDURE — 99204 OFFICE O/P NEW MOD 45 MIN: CPT | Performed by: STUDENT IN AN ORGANIZED HEALTH CARE EDUCATION/TRAINING PROGRAM

## 2025-04-23 RX ORDER — MELOXICAM 7.5 MG/1
7.5 TABLET ORAL 2 TIMES DAILY PRN
Qty: 60 TABLET | Refills: 3 | Status: SHIPPED | OUTPATIENT
Start: 2025-04-23

## 2025-04-23 RX ORDER — BACLOFEN 5 MG/1
5 TABLET ORAL 3 TIMES DAILY PRN
Qty: 90 TABLET | Refills: 1 | Status: SHIPPED | OUTPATIENT
Start: 2025-04-23

## 2025-04-23 ASSESSMENT — ENCOUNTER SYMPTOMS: BACK PAIN: 1

## 2025-04-23 NOTE — PROGRESS NOTES
[facility] [place] [facility], [facility] PAIN [facility]NAGEMENT  [address] [address]  Dept: [phone]  Dept Fax: [phone]  Loc: [phone]     4/23/2025    Visit type: New patient    Reason for Visit: Back Pain (Lower left side)       ASSESSMENT/PLAN   1. Lumbar spondylosis  Assessment & Plan:     Chronic illness with a severe exacerbation and/or progression which affects ADL's. Pain has been present for multiple years and is expected to last at least a year. Patient is unable to sleep, transfer, nor ambulate. Goals of care include pain relief >50% and ability to perform ADLs with less pain.    Very pleasant 54-year-old male with history of DISH who presents with chronic low back pain that has been on and off for several years.  Pain is extremely debilitating and affects ADLs.  Pain score 6 or greater with activity.  Pain limits mobility and functionality.    Patient displays chronic low axial back pain with no radiation.  Extension and rotation of the lumbar spine elicits pain. Pain is located left.  Patient displays tenderness to palpation in the left lumbar paraspinal musculature.  Positive facet loading left .     Lumbar and thoracic spine x-ray reviewed.  Degenerative changes noted.  Evidence of DISH syndrome.  FINDINGS:  Thoracic spine: There is increased kyphosis.  Bridging calcifications are  seen anteriorly at all levels.  Mild anterior wedging is seen at T11 and T12.  There are also hypertrophic spurs seen on the medial and lateral aspects of  the vertebral bodies.  No definite fracture seen.     Lumbar spine: The vertebral body heights are maintained.  Mild intervertebral  disc space narrowing is seen at L3-4.  Moderately severe intervertebral disc  space narrowing is seen at L5-S1.  Facet arthrosis is seen at this level.  There are bridging anterior osteophytes seen at T12-L1 and moderate-sized  anterior osteophyte is seen at L2.  Smaller anterior

## 2025-04-23 NOTE — ASSESSMENT & PLAN NOTE
Chronic illness with a severe exacerbation and/or progression which affects ADL's. Pain has been present for multiple years and is expected to last at least a year. Patient is unable to sleep, transfer, nor ambulate. Goals of care include pain relief >50% and ability to perform ADLs with less pain.    Very pleasant 54-year-old male with history of DISH who presents with chronic low back pain that has been on and off for several years.  Pain is extremely debilitating and affects ADLs.  Pain score 6 or greater with activity.  Pain limits mobility and functionality.    Patient displays chronic low axial back pain with no radiation.  Extension and rotation of the lumbar spine elicits pain. Pain is located left.  Patient displays tenderness to palpation in the left lumbar paraspinal musculature.  Positive facet loading left .     Lumbar and thoracic spine x-ray reviewed.  Degenerative changes noted.  Evidence of DISH syndrome.  FINDINGS:  Thoracic spine: There is increased kyphosis.  Bridging calcifications are  seen anteriorly at all levels.  Mild anterior wedging is seen at T11 and T12.  There are also hypertrophic spurs seen on the medial and lateral aspects of  the vertebral bodies.  No definite fracture seen.     Lumbar spine: The vertebral body heights are maintained.  Mild intervertebral  disc space narrowing is seen at L3-4.  Moderately severe intervertebral disc  space narrowing is seen at L5-S1.  Facet arthrosis is seen at this level.  There are bridging anterior osteophytes seen at T12-L1 and moderate-sized  anterior osteophyte is seen at L2.  Smaller anterior osteophytes are seen at  L4-5.  There is no evidence for acute fracture or dislocation.     IMPRESSION:  1. findings are suggestive of DISH in the thoracic spine 2.  Degenerative  changes are seen in the lumbar spine and are most significant at L5-S1.    Given his history, physical exam findings, imaging, and lack of response to current measures, I

## 2025-04-23 NOTE — PROGRESS NOTES
HPI  Onset: on and off for several years  Location: low left side of back  Quality: aching, cramping, shooting, stabbing, and sharp  Patient states that his pain is at a 2 at rest and a 5 with activity on the pain scale.   The pain is present: Constantly  and consistent  The pain is exacerbated by: Bending, Lifting, Sitting, Standing, and movement from sitting to standing, moving from side to side in bed  and alleviated by: Rest, sitting, and Medication.  The patient states the pain interferes with his  ADL's : Yes Transferring , Ambulating , and Sleeping  Recent falls: no  Bladder bowel dysfunction:no  He is taking the following medications for pain:   NSAIDS: ibuprofen and Muscle Relaxants: cyclobenzaprine (Flexeril)  Prior treatments tried for chief complaint listed above: n/a  Surgery: no  Physical Therapy: no.  Chiropractor: no  Acupuncture: no  Massage Therapy: no   Behavior/Wellness/Psychological support: yes  Expectations of Treatment at the Pain Center: The patient presents today for evaluation with the expectation that they will be able to perform their ADL's without excruciating pain.   Patient denies the following symptoms: Ataxia, saddle anesthesia, nausea, fever, vomiting, or recent antibiotics.

## 2025-05-02 ENCOUNTER — HOSPITAL ENCOUNTER (OUTPATIENT)
Dept: PHYSICAL THERAPY | Age: 55
Setting detail: THERAPIES SERIES
Discharge: HOME | End: 2025-05-02
Payer: COMMERCIAL

## 2025-05-02 PROCEDURE — 97110 THERAPEUTIC EXERCISES: CPT

## 2025-05-02 PROCEDURE — 97162 PT EVAL MOD COMPLEX 30 MIN: CPT

## 2025-05-02 NOTE — PLAN OF CARE
and patient agrees with plan of care.  Yes  [x]  No  []   Explain:     Kesha Fall Risk Assessment  Risk Factor Scale  Score   History of Falls [] Yes  [x] No 25  0 0   Secondary Diagnosis [] Yes  [x] No 15  0 0   Ambulatory Aid [] Furniture  [] Crutches/cane/walker  [x] None/bedrest/wheelchair/nurse 30  15  0 0   IV/Heparin Lock [] Yes  [x] No 20  0 0   Gait/Transferring [] Impaired  [] Weak  [x] Normal/bedrest/immobile 20  10  0 0   Mental Status [] Forgets limitations  [x] Oriented to own ability 15  0 0      Total:0     Based on the Assessment score: check the appropriate box.  [x]  No intervention needed   Low =   Score of 0-24  []  Use standard prevention interventions Moderate =  Score of 24-44   [] Discuss fall prevention strategies   [] Indicate moderate falls risk on eval  []  Use high risk prevention interventions High = Score of 45 and higher   [] Discuss fall prevention strategies   [] Provide supervision during treatment time    Minutes  PT Individual Minutes  Time In: 0804  Time Out: 0847  Minutes: 43  Timed Code Treatment Minutes: 8 Minutes  Procedure Minutes:35 min eval     Timed Activity Minutes Units   Ther Ex 8 1   Electronically signed by Michelle Livingston PT on 5/2/25 at 8:10 AM EDT           Please sign Physician's Certification and return to:   [place] [id number]  Dept: [phone]  Dept Fax: [phone]  Loc: [phone]    Physician's Certification / Comments     Statement of Medical Necessity: Physical Therapy is both indicated and medically necessary as outlined in the POC to increase the likelihood of meeting the functionally related goals stated above.     Patient to be seen 1-2 times per week for 4-6 weeks    If you have any questions or concerns, please don't hesitate to call.  Thank you for your referral.    I have reviewed this plan of care and certify a need for medically necessary

## 2025-05-07 ENCOUNTER — OFFICE VISIT (OUTPATIENT)
Age: 55
End: 2025-05-07
Payer: COMMERCIAL

## 2025-05-07 VITALS
HEIGHT: 67 IN | TEMPERATURE: 97.6 F | SYSTOLIC BLOOD PRESSURE: 132 MMHG | BODY MASS INDEX: 44.83 KG/M2 | HEART RATE: 95 BPM | DIASTOLIC BLOOD PRESSURE: 80 MMHG | WEIGHT: 285.6 LBS | OXYGEN SATURATION: 95 %

## 2025-05-07 DIAGNOSIS — E11.9 TYPE 2 DIABETES MELLITUS WITHOUT COMPLICATION, WITHOUT LONG-TERM CURRENT USE OF INSULIN (HCC): ICD-10-CM

## 2025-05-07 DIAGNOSIS — B35.1 ONYCHOMYCOSIS: ICD-10-CM

## 2025-05-07 DIAGNOSIS — E11.9 TYPE 2 DIABETES MELLITUS WITHOUT COMPLICATION, WITHOUT LONG-TERM CURRENT USE OF INSULIN (HCC): Primary | ICD-10-CM

## 2025-05-07 DIAGNOSIS — E78.2 MIXED HYPERLIPIDEMIA: ICD-10-CM

## 2025-05-07 DIAGNOSIS — I10 PRIMARY HYPERTENSION: ICD-10-CM

## 2025-05-07 PROCEDURE — 99214 OFFICE O/P EST MOD 30 MIN: CPT | Performed by: FAMILY MEDICINE

## 2025-05-07 PROCEDURE — 3079F DIAST BP 80-89 MM HG: CPT | Performed by: FAMILY MEDICINE

## 2025-05-07 PROCEDURE — 3075F SYST BP GE 130 - 139MM HG: CPT | Performed by: FAMILY MEDICINE

## 2025-05-07 RX ORDER — TERBINAFINE HYDROCHLORIDE 250 MG/1
250 TABLET ORAL DAILY
Qty: 90 TABLET | Refills: 0 | Status: SHIPPED | OUTPATIENT
Start: 2025-05-07

## 2025-05-07 NOTE — PROGRESS NOTES
[facility]  [facility]  [address] [address]  Dept: [phone]  Dept Fax: [phone]  Loc: [phone]     Subjective  John Aguilar, 54 y.o. male Established patient presents today with:  Chief Complaint   Patient presents with    Diabetes     Last A1c on 11.7.24 was 8.1    Hyperlipidemia    Nail Problem     Patient needs refill for nail fungal        History of Present Illness  The patient presents for evaluation of muscle pain, diabetes, and nail infection.    He reports that physical therapy has not been beneficial in managing his muscle pain. He mentions a potential nerve ablation procedure, which involves using a heated needle to burn nerve endings, as a possible next step if necessary. He has previously undergone a similar procedure in his hip, which did not yield significant improvement. A prescribed steroid pack provided temporary relief, but the effects were short-lived.    He acknowledges that his blood sugar levels have been suboptimal due to recent dietary indiscretions during Easter and several birthday celebrations. He also notes that the use of steroids may have contributed to elevated blood glucose levels. His last recorded A1C was 8.1.    He requests a refill of his medication for nail infection, which appears to be showing signs of improvement.      Past Medical History:   Diagnosis Date    Diabetes mellitus (HCC)     Hypertension     Seizures (HCC) 01/01/2012     Past Surgical History:   Procedure Laterality Date    COLONOSCOPY N/A 7/12/2023    COLONOSCOPY WITH POLYPECTOMY performed by Chase Cantu MD at [facility]    COLONOSCOPY N/A 6/13/2024    COLORECTAL CANCER SCREENING, HIGH RISK performed by Chase Cantu MD at [facility]    VASECTOMY  2009     Immunization History   Administered Date(s) Administered    COVID-19, MODERNA BLUE border, Primary

## 2025-05-08 ENCOUNTER — RESULTS FOLLOW-UP (OUTPATIENT)
Age: 55
End: 2025-05-08

## 2025-05-08 LAB
ESTIMATED AVERAGE GLUCOSE: 180 MG/DL
HBA1C MFR BLD: 7.9 % (ref 4–6)

## 2025-05-12 ENCOUNTER — HOSPITAL ENCOUNTER (OUTPATIENT)
Dept: PHYSICAL THERAPY | Age: 55
Setting detail: THERAPIES SERIES
Discharge: HOME | End: 2025-05-12
Payer: COMMERCIAL

## 2025-05-12 PROCEDURE — 97110 THERAPEUTIC EXERCISES: CPT

## 2025-05-12 PROCEDURE — 97140 MANUAL THERAPY 1/> REGIONS: CPT

## 2025-05-12 ASSESSMENT — PAIN DESCRIPTION - LOCATION: LOCATION: BACK

## 2025-05-12 ASSESSMENT — PAIN DESCRIPTION - DESCRIPTORS: DESCRIPTORS: ACHING

## 2025-05-12 ASSESSMENT — PAIN DESCRIPTION - PAIN TYPE: TYPE: CHRONIC PAIN

## 2025-05-12 NOTE — PROGRESS NOTES
[facility]  Outpatient Physical Therapy   Treatment Note        Date: 2025  Patient: John Aguilar  : 1970   Confirmed: Yes  MRN: [id number]  Referring Provider: Joan Chino APRN - CNP      Medical Diagnosis: Lumbar back pain [M54.50]  Acute bilateral thoracic back pain [M54.6]      Treatment Diagnosis: LBP with muscular tightness affecting strength and flexibility and overall functional tolerance.    Visit Information:  Insurance: Payor: JOHAN CALDWELL / Plan: JOHAN CALDWELL HIGHMARK / Product Type: *No Product type* /   PT Visit Information  Onset Date:  (Long hx of pain however 4 years ago pain more consistent to severe the last few months)  Total # of Visits Approved:  (BMN)  Total # of Visits to Date: 2  No Show: 0  Canceled Appointment: 0  Progress Note Counter:     Subjective Information:  Subjective: Patient reports the stretches are going well.  HEP Compliance:  [x] Good [] Fair [] Poor [] Reports not doing due to:             Pain Screening  Patient Currently in Pain: Yes  Pain Assessment: 0-10  Pain Level:  (1/2)  Pain Type: Chronic pain  Pain Location: Back  Pain Descriptors: Aching    Treatment:  Exercises:  Exercises  Exercise 1: ham stretch 30s x 3 B, piriformis seated 30s x 3 B  Exercise 2: barrel stretch 10 sec hold x 5  Exercise 3: LTR 5 sec hold x 10, SKTC with towel 20 sec hold x 3  Exercise 6: TA isometric 5 sec hold x 10, TA march x 10  Exercise 7: clamshells 5 sec hold x 10, reverse clamshells x 10  Exercise 15: HEP: LTR, SKTC, TA isometric, TA march, clamshells, reverse clamshells       Manual:   Manual Therapy  Soft Tissue Mobilizaton: STM left lumbar paraspinals with tennis ball x 8 minutes         *Indicates exercise, modality, or manual techniques to be initiated when appropriate        Assessment:   Body Structures, Functions, Activity Limitations Requiring Skilled Therapeutic Intervention: Decreased posture, Increased pain, Decreased functional mobility , Decreased ROM,

## 2025-05-13 ENCOUNTER — OFFICE VISIT (OUTPATIENT)
Age: 55
End: 2025-05-13
Payer: COMMERCIAL

## 2025-05-13 VITALS
OXYGEN SATURATION: 97 % | BODY MASS INDEX: 44.79 KG/M2 | HEART RATE: 86 BPM | WEIGHT: 286 LBS | DIASTOLIC BLOOD PRESSURE: 84 MMHG | SYSTOLIC BLOOD PRESSURE: 124 MMHG

## 2025-05-13 DIAGNOSIS — E66.01 MORBID OBESITY (HCC): ICD-10-CM

## 2025-05-13 DIAGNOSIS — G47.33 OSA (OBSTRUCTIVE SLEEP APNEA): Primary | ICD-10-CM

## 2025-05-13 PROCEDURE — 99214 OFFICE O/P EST MOD 30 MIN: CPT | Performed by: INTERNAL MEDICINE

## 2025-05-13 NOTE — PROGRESS NOTES
Subjective:             John Aguilar is a 54 y.o. male who complains today of:     Chief Complaint   Patient presents with    Follow-up     4m f/u on HERMES       HPI  He is using CPAP with  13  centimeters of H2O with heated humidity.  He is using CPAP for about  13  hours every night.  He is using CPAP with  full face mask  Mask.  He said  sleep is restful with the CPAP use.  He is compliant with CPAP therapy and benefiting with CPAP use.  No snoring with CPAP use.  No complaint of daytime sleepiness or tiredness with CPAP use.  He denies taking naps.No sleepiness with driving.   He denies difficulty falling asleep or staying asleep.     I reviewed compliance report with patient regarding CPAP therapy. He is using  CPAP for 24  days out of 30 days.  Average usage of days used is 5 hours and 19 min , average AHI 0.3 with CPAP use.     Allergies:  Patient has no known allergies.  Past Medical History:   Diagnosis Date    Diabetes mellitus (HCC)     Hypertension     Seizures (HCC) 01/01/2012     Past Surgical History:   Procedure Laterality Date    COLONOSCOPY N/A 7/12/2023    COLONOSCOPY WITH POLYPECTOMY performed by Chase Cantu MD at [facility]    COLONOSCOPY N/A 6/13/2024    COLORECTAL CANCER SCREENING, HIGH RISK performed by Chase Cantu MD at [facility]    VASECTOMY  2009     No family history on file.  Social History     Socioeconomic History    Marital status:      Spouse name: Not on file    Number of children: Not on file    Years of education: Not on file    Highest education level: Not on file   Occupational History    Not on file   Tobacco Use    Smoking status: Never    Smokeless tobacco: Never   Substance and Sexual Activity    Alcohol use: Yes     Comment: occ    Drug use: No    Sexual activity: Not on file   Other Topics Concern    Not on file   Social History Narrative    Not on file     Social Drivers of Health     Financial Resource Strain: Low Risk  (5/21/2024)

## 2025-05-14 ENCOUNTER — HOSPITAL ENCOUNTER (OUTPATIENT)
Dept: PHYSICAL THERAPY | Age: 55
Setting detail: THERAPIES SERIES
Discharge: HOME | End: 2025-05-14
Payer: COMMERCIAL

## 2025-05-14 PROCEDURE — 97140 MANUAL THERAPY 1/> REGIONS: CPT

## 2025-05-14 PROCEDURE — 97110 THERAPEUTIC EXERCISES: CPT

## 2025-05-14 ASSESSMENT — PAIN DESCRIPTION - LOCATION: LOCATION: BACK

## 2025-05-14 ASSESSMENT — PAIN SCALES - GENERAL: PAINLEVEL_OUTOF10: 1

## 2025-05-14 ASSESSMENT — PAIN DESCRIPTION - DESCRIPTORS: DESCRIPTORS: TIGHTNESS

## 2025-05-14 ASSESSMENT — PAIN DESCRIPTION - PAIN TYPE: TYPE: CHRONIC PAIN

## 2025-05-14 NOTE — PROGRESS NOTES
[facility]  Outpatient Physical Therapy   Treatment Note        Date: 2025  Patient: John Aguilar  : 1970   Confirmed: Yes  MRN: [id number]  Referring Provider: Joan Chino APRN - CNP      Medical Diagnosis: Lumbar back pain [M54.50]  Acute bilateral thoracic back pain [M54.6]      Treatment Diagnosis: LBP with muscular tightness affecting strength and flexibility and overall functional tolerance.    Visit Information:  Insurance: Payor: JOHAN CALDWELL / Plan: JOHAN CALDWELL HIGHMARK / Product Type: *No Product type* /   PT Visit Information  Onset Date:  (Long hx of pain however 4 years ago pain more consistent to severe the last few months)  Total # of Visits Approved:  (BMN)  Total # of Visits to Date: 3  No Show: 0  Canceled Appointment: 0  Progress Note Counter: 3/12    Subjective Information:  Subjective: Pt reprots that the LTR bothers it a little more.  HEP Compliance:  [x] Good [] Fair [] Poor [] Reports not doing due to:    Pain Screening  Patient Currently in Pain: Yes  Pain Assessment: 0-10  Pain Level: 1  Pain Type: Chronic pain  Pain Location: Back  Pain Descriptors: Tightness    Treatment:  Exercises:  Exercises  Exercise 1: ham stretch 30s x 3 B, piriformis seated 30s x 3 B  Exercise 3: LTR 5 sec hold x 10, SKTC with towel 20 sec hold x 3  Exercise 5: midrow/lat pull RTB x10 , chest pull (try supine-advance to sitting)*  Exercise 6: TA isometric 5 sec hold x 10, TA march x 10  Exercise 7: clamshells 5 sec hold x 10       Manual:   Manual Therapy  Soft Tissue Mobilization: STM left lumbar paraspinals with tennis ball x 8 minutes  *Indicates exercise, modality, or manual techniques to be initiated when appropriate      Assessment:   Body Structures, Functions, Activity Limitations Requiring Skilled Therapeutic Intervention: Decreased posture, Increased pain, Decreased functional mobility , Decreased ROM, Decreased strength, Decreased balance  Assessment: Pt with increased LB pain with

## 2025-05-15 DIAGNOSIS — M47.816 LUMBAR SPONDYLOSIS: ICD-10-CM

## 2025-05-16 NOTE — TELEPHONE ENCOUNTER
Changes Requested     Name from pharmacy: BACLOFEN 5 MG TABLET         Will file in chart as: Baclofen (LIORESAL) 5 MG tablet    Sig: TAKE 1 TABLET BY MOUTH 3 TIMES DAILY AS NEEDED FOR MUSCLE ACHES    Disp: 270 tablet    Refills: 1    Start: 5/15/2025    Class: Normal    Non-formulary For: Lumbar spondylosis    Last ordered: 3 weeks ago (4/23/2025) by Moo Thompson DO    Last refill: 4/23/2025    Rx #: [id number]    Pharmacy comment: REQUEST FOR 90 DAYS PRESCRIPTION. DX Code Needed.       To be filled at: [facility] [address] [place] [address]  - [place] [phone] - F [phone]

## 2025-05-19 ENCOUNTER — HOSPITAL ENCOUNTER (OUTPATIENT)
Dept: PHYSICAL THERAPY | Age: 55
Setting detail: THERAPIES SERIES
Discharge: HOME | End: 2025-05-19
Payer: COMMERCIAL

## 2025-05-19 PROCEDURE — 97110 THERAPEUTIC EXERCISES: CPT

## 2025-05-19 RX ORDER — BACLOFEN 5 MG/1
TABLET ORAL
Qty: 270 TABLET | Refills: 1 | Status: SHIPPED | OUTPATIENT
Start: 2025-05-19

## 2025-05-19 ASSESSMENT — PAIN SCALES - GENERAL: PAINLEVEL_OUTOF10: 0

## 2025-05-19 ASSESSMENT — PAIN DESCRIPTION - PAIN TYPE: TYPE: CHRONIC PAIN

## 2025-05-19 ASSESSMENT — PAIN DESCRIPTION - DESCRIPTORS: DESCRIPTORS: TIGHTNESS

## 2025-05-19 ASSESSMENT — PAIN DESCRIPTION - LOCATION: LOCATION: BACK

## 2025-05-19 NOTE — PROGRESS NOTES
[facility]  Outpatient Physical Therapy   Treatment Note        Date: 2025  Patient: John Aguilar  : 1970   Confirmed: Yes  MRN: [id number]  Referring Provider: Joan Chino APRN - CNP      Medical Diagnosis: Lumbar back pain [M54.50]  Acute bilateral thoracic back pain [M54.6]      Treatment Diagnosis: LBP with muscular tightness affecting strength and flexibility and overall functional tolerance.    Visit Information:  Insurance: Payor: JOHAN CALDWELL / Plan: JOHAN CALDWELL HIGHMARK / Product Type: *No Product type* /   PT Visit Information  Onset Date:  (Long hx of pain however 4 years ago pain more consistent to severe the last few months)  Total # of Visits Approved:  (BMN)  Total # of Visits to Date: 4  No Show: 0  Canceled Appointment: 0  Progress Note Counter:     Subjective Information:  Subjective: Pt reports that the back is feeling alittle better. Doing HEP everyday.  HEP Compliance:  [x] Good [] Fair [] Poor [] Reports not doing due to:      Pain Screening  Patient Currently in Pain: No  Pain Assessment: 0-10  Pain Level: 0  Pain Type: Chronic pain  Pain Location: Back  Pain Descriptors: Tightness    Treatment:  Exercises:  Exercises  Exercise 1: ham stretch 30s x 3 B, piriformis seated 30s x 3 B  Exercise 2: barrel stretch 10 sec hold x 5  Exercise 3: 6 inch step up x10 f/l  Exercise 5: midrow/lat pull RTB x15  Exercise 6: GTB palloff press x10  Exercise 7: 4 way hip YTB x10      *Indicates exercise, modality, or manual techniques to be initiated when appropriate        Assessment:   Body Structures, Functions, Activity Limitations Requiring Skilled Therapeutic Intervention: Decreased posture, Increased pain, Decreased functional mobility , Decreased ROM, Decreased strength, Decreased balance  Assessment: Pt with no increase in LB pain throughout treatment. Initiated hip strengthening exercises.  Will cont to progress core and LE strengthening to reduce pain.  Treatment Diagnosis: LBP

## 2025-06-02 ENCOUNTER — APPOINTMENT (OUTPATIENT)
Dept: PHYSICAL THERAPY | Age: 55
End: 2025-06-02
Payer: COMMERCIAL

## 2025-06-09 ENCOUNTER — HOSPITAL ENCOUNTER (OUTPATIENT)
Dept: PHYSICAL THERAPY | Age: 55
Setting detail: THERAPIES SERIES
Discharge: HOME | End: 2025-06-09
Payer: COMMERCIAL

## 2025-06-09 PROCEDURE — 97110 THERAPEUTIC EXERCISES: CPT

## 2025-06-09 NOTE — PROGRESS NOTES
50% to improve transfers and bed mobility without guarding LTG 5 Current Status:: 0% pain   Met     Body Structures, Functions, Activity Limitations Requiring Skilled Therapeutic Intervention: Decreased posture, Increased pain, Decreased functional mobility , Decreased ROM, Decreased strength, Decreased balance  Assessment: Pt has met all goals this date. Pt reports to doing a different job at work and has no more pain and is able to do HEP on own at home. Pt able to do SLS 15-20 seconds jane and WNL for ROM. No further skilled PT at this time.   Therapy Prognosis: Good    PLAN: [x] DC  Frequency/Duration:        Precautions:   seizure years ago and continues to take medication                          Patient Status:[] Continue/ Initiate plan of Care     [x] Discharge PT.  Recommend pt continue with HEP.      [] Additional visits requested, Please re-certify for additional visits:     [] Hold     Signature:  Electronically signed by Lalita Garcia PTA on 6/9/25 at 11:05 AM EDT        Electronically signed by Michelle Livingston PT on 6/9/2025 at 3:20 PM    If you have any questions or concerns, please don't hesitate to call.  Thank you for your referral.    I have reviewed this plan of care and certify a need for medically necessary rehabilitation services.    Physician Signature:__________________________________________________________  Date:  Please sign and return           
Minutes  Time In: 0841  Time Out: 0920  Minutes: 39  Timed Code Treatment Minutes: 39 Minutes  Procedure Minutes: N/A  Timed Activity Minutes Units   Ther Ex        39        3      Electronically signed by Lalita Garcia PTA on 6/9/25 at 11:02 AM EDT

## 2025-08-04 ENCOUNTER — OFFICE VISIT (OUTPATIENT)
Age: 55
End: 2025-08-04
Payer: COMMERCIAL

## 2025-08-04 VITALS
HEIGHT: 67 IN | BODY MASS INDEX: 44.89 KG/M2 | HEART RATE: 94 BPM | DIASTOLIC BLOOD PRESSURE: 75 MMHG | OXYGEN SATURATION: 96 % | TEMPERATURE: 97.5 F | SYSTOLIC BLOOD PRESSURE: 130 MMHG | WEIGHT: 286 LBS

## 2025-08-04 DIAGNOSIS — M47.816 LUMBAR SPONDYLOSIS: Primary | ICD-10-CM

## 2025-08-04 PROCEDURE — 99214 OFFICE O/P EST MOD 30 MIN: CPT | Performed by: STUDENT IN AN ORGANIZED HEALTH CARE EDUCATION/TRAINING PROGRAM

## 2025-08-04 ASSESSMENT — ENCOUNTER SYMPTOMS: BACK PAIN: 1

## (undated) DEVICE — TUBING, SUCTION, 1/4" X 10', STRAIGHT: Brand: MEDLINE

## (undated) DEVICE — GLOVE ORTHO 8   MSG9480

## (undated) DEVICE — BRUSH ENDO CLN L90.5IN SHTH DIA1.7MM BRIST DIA5-7MM 2-6MM

## (undated) DEVICE — PLATE ES AD W 9FT CRD 2

## (undated) DEVICE — Device: Brand: ENDO SMARTCAP

## (undated) DEVICE — TUBE SET 96 MM 64 MM H2O PERISTALTIC STD AUX CHANNEL

## (undated) DEVICE — TRAP POLYP BALEEN

## (undated) DEVICE — SNARE ENDOSCP AD L240CM LOOP W10MM SHTH DIA2.4MM RND INSUL

## (undated) DEVICE — SINGLE PORT MANIFOLD: Brand: NEPTUNE 2

## (undated) DEVICE — ENDO CARRY-ON PROCEDURE KIT: Brand: ENDO CARRY-ON PROCEDURE KIT

## (undated) DEVICE — GLOVE ORANGE PI 8 1/2   MSG9085

## (undated) DEVICE — PATIENT RETURN ELECTRODE, SINGLE-USE, NON CONTACT QUALITY MONITORING, ADULT, WITH 9 FT (2.7 M) CORD, FOR PATIENTS WEIGHING OVER 33LBS. (15KG): Brand: MEGADYNE

## (undated) DEVICE — FORCEPS BX L240CM JAW DIA2.8MM L CAP W/ NDL MIC MESH TOOTH

## (undated) DEVICE — SNARE HEX 2.4X13MM

## (undated) DEVICE — INSTINCT PLUS ENDOSCOPIC CLIPPING DEVICE: Brand: INSTINCT